# Patient Record
Sex: MALE | Race: WHITE | NOT HISPANIC OR LATINO | Employment: OTHER | URBAN - NONMETROPOLITAN AREA
[De-identification: names, ages, dates, MRNs, and addresses within clinical notes are randomized per-mention and may not be internally consistent; named-entity substitution may affect disease eponyms.]

---

## 2017-01-04 ENCOUNTER — HOSPITAL ENCOUNTER (EMERGENCY)
Facility: HOSPITAL | Age: 57
Discharge: HOME | End: 2017-01-04
Attending: PHYSICIAN ASSISTANT | Admitting: PHYSICIAN ASSISTANT
Payer: MEDICARE

## 2017-01-04 VITALS
SYSTOLIC BLOOD PRESSURE: 169 MMHG | HEART RATE: 117 BPM | TEMPERATURE: 98.4 F | RESPIRATION RATE: 14 BRPM | OXYGEN SATURATION: 94 % | DIASTOLIC BLOOD PRESSURE: 104 MMHG

## 2017-01-04 DIAGNOSIS — F11.93 OPIATE WITHDRAWAL (H): ICD-10-CM

## 2017-01-04 PROCEDURE — 96372 THER/PROPH/DIAG INJ SC/IM: CPT

## 2017-01-04 PROCEDURE — 99284 EMERGENCY DEPT VISIT MOD MDM: CPT | Mod: 25

## 2017-01-04 PROCEDURE — 99284 EMERGENCY DEPT VISIT MOD MDM: CPT | Performed by: PHYSICIAN ASSISTANT

## 2017-01-04 PROCEDURE — 25000125 ZZHC RX 250: Performed by: PHYSICIAN ASSISTANT

## 2017-01-04 RX ORDER — HYDROCODONE BITARTRATE AND ACETAMINOPHEN 5; 325 MG/1; MG/1
1-2 TABLET ORAL EVERY 4 HOURS PRN
Qty: 10 TABLET | Refills: 0 | Status: SHIPPED | OUTPATIENT
Start: 2017-01-04 | End: 2018-12-01

## 2017-01-04 RX ADMIN — MORPHINE SULFATE 10 MG: 10 INJECTION, SOLUTION INTRAMUSCULAR; INTRAVENOUS at 12:14

## 2017-01-04 ASSESSMENT — ENCOUNTER SYMPTOMS
CHILLS: 1
ABDOMINAL PAIN: 1
DIARRHEA: 1
CHEST TIGHTNESS: 0
NERVOUS/ANXIOUS: 1
ACTIVITY CHANGE: 1
APPETITE CHANGE: 1
FEVER: 0
VOMITING: 1
RHINORRHEA: 1
NAUSEA: 1
SHORTNESS OF BREATH: 0

## 2017-01-04 NOTE — ED AVS SNAPSHOT
[facility]    [address] [address]    Phone:  [phone]                                       Sidney Pereira   MRN: [id number]    Department:  [facility]   Date of Visit:  1/4/2017           After Visit Summary Signature Page     I have received my discharge instructions, and my questions have been answered. I have discussed any challenges I see with this plan with the nurse or doctor.    ..........................................................................................................................................  Patient/Patient Representative Signature      ..........................................................................................................................................  Patient Representative Print Name and Relationship to Patient    ..................................................               ................................................  Date                                            Time    ..........................................................................................................................................  Reviewed by Signature/Title    ...................................................              ..............................................  Date                                                            Time

## 2017-01-04 NOTE — ED PROVIDER NOTES
"  History     Chief Complaint   Patient presents with     Withdrawal     \"I've been on pain pills for 5-6 years. [facility] and now I'm going through withdrawals.\"     Nausea, Vomiting, & Diarrhea     since yesterday     The history is provided by the patient.     Sidney Preeira is a 56 year old male who presented to the ED along with his wife for withdrawal symptoms.  He tells me that he has been on chronic pain medications for at least 4 years.  [facility].  He has been out of his Morphine and hydrocodone since then.  He now has nausea, vomiting, diarrhea, abdominal cramping, anxiety, etc.  Tells me that he has gone through this type of withdrawal before and this is identical.  No fevers.  No melena.  No hematochezia.  Called his clinic and they of course advised him to come to the ED.     I have reviewed the Medications, Allergies, Past Medical and Surgical History, and Social History in the Epic system.    Review of Systems   Constitutional: Positive for chills, activity change and appetite change. Negative for fever.   HENT: Positive for congestion and rhinorrhea.    Respiratory: Negative for chest tightness and shortness of breath.    Cardiovascular: Negative for chest pain.   Gastrointestinal: Positive for nausea, vomiting, abdominal pain and diarrhea.   Genitourinary: Negative.    Musculoskeletal:        Chronic pain    Skin: Negative.    Psychiatric/Behavioral: The patient is nervous/anxious.        Physical Exam   BP: (!) 139/108 mmHg  Pulse: 117  Heart Rate: 107  Temp: 97.4  F (36.3  C)  Resp: 14  SpO2: 95 %  Physical Exam   Constitutional: He is oriented to person, place, and time. He appears well-developed and well-nourished. No distress.   Normal gait    Cardiovascular: Regular rhythm.    Tachycardia    Pulmonary/Chest: Effort normal.   Neurological: He is alert and oriented to person, place, and time.   Skin: Skin is warm and dry. "   Psychiatric: He has a normal mood and affect.   Nursing note and vitals reviewed.      ED Course   Procedures           Medications   morphine injection 10 mg (10 mg Intramuscular Given 1/4/17 1214)     Critical Care time:  none               Labs Ordered and Resulted from Time of ED Arrival Up to the Time of Departure from the ED - No data to display    Assessments & Plan (with Medical Decision Making)   [place]NPMP accessed.  He is truthful.  Consistent with opiate withdrawal. I do not believe that there is any reason for labs or imaging.  We will treat him with a small course of his usual medications and hope for arrival tomorrow. Needs to discuss this with his clinic.  Return here for ANY other concerns or questions. He can be safely discharged.      I have reviewed the nursing notes.    I have reviewed the findings, diagnosis, plan and need for follow up with the patient.    Discharge Medication List as of 1/4/2017 12:17 PM      START taking these medications    Details   HYDROcodone-acetaminophen (NORCO) 5-325 MG per tablet Take 1-2 tablets by mouth every 4 hours as needed for moderate to severe pain, Disp-10 tablet, R-0, Local Print             Final diagnoses:   Opiate withdrawal (H)       1/4/2017   [facility]      Patsy Shaw PA-C  01/04/17 0547

## 2017-01-04 NOTE — DISCHARGE INSTRUCTIONS
[facility]     You are welcome to return here for ANY other concerns or questions.

## 2017-01-04 NOTE — ED AVS SNAPSHOT
[facility]    [address] [address]    Phone:  [phone]                                       Sidney Pereira   MRN: [id number]    Department:  [facility]   Date of Visit:  1/4/2017           Patient Information     Date Of Birth          1960        Your diagnoses for this visit were:     Opiate withdrawal (H)        You were seen by Patsy Shaw PA-C.      Follow-up Information     Follow up with Olegario Santos MD.    Specialty:  Family Practice    Contact information:    [facility]  [address] [facility] [address] [phone]          Follow up with [facility].    Specialty:  EMERGENCY MEDICINE    Why:  If symptoms worsen    Contact information:    [address] [address] [phone]    Additional information:    From [facility]: Take [address]. Turn left at [address]. Turn left at the first stoplight on [address]. At the first stop sign, take a right onto [address]. Take a left into the parking lot and continue through until you reach the North enterance of the building.       From [place]: Take [address]. Take the [address] ramp towards [place]. Turn left onto [address]. Take a slight right onto [address]. Turn left at the first stoplight on [address]. At the first stop sign, take a right onto [address]. Take a left into the parking lot and continue through until you reach the North enterance of the building.       From [place]: Take [address]. Take a right at [address]. At the first stop sign, take a right onto [address]. Take a left into the parking lot and continue through until you reach the North enterance of the building.         Discharge Instructions       [facility]     You are welcome to return here for ANY other concerns or questions.         Review of your medicines       CONTINUE these medicines which may have CHANGED, or have new prescriptions. If we are uncertain of the size of tablets/capsules you have at home, strength may be listed as something that might have changed.        Dose / Directions Last dose taken    * HYDROcodone-acetaminophen 5-500 MG per tablet   Commonly known as:  VICODIN   Dose:  1 tablet   What changed:  Another medication with the same name was added. Make sure you understand how and when to take each.        Take 1 tablet by mouth 3 times daily   Refills:  0        * HYDROcodone-acetaminophen 5-325 MG per tablet   Commonly known as:  NORCO   Dose:  1-2 tablet   What changed:  You were already taking a medication with the same name, and this prescription was added. Make sure you understand how and when to take each.   Quantity:  10 tablet        Take 1-2 tablets by mouth every 4 hours as needed for moderate to severe pain   Refills:  0        * Notice:  This list has 2 medication(s) that are the same as other medications prescribed for you. Read the directions carefully, and ask your doctor or other care provider to review them with you.      Our records show that you are taking the medicines listed below. If these are incorrect, please call your family doctor or clinic.        Dose / Directions Last dose taken    * MORPHINE SULFATE PO   Dose:  15 mg        Take 15 mg by mouth 2 times daily   Refills:  0        * MORPHINE SULFATE PO   Dose:  30 mg        Take 30 mg by mouth At Bedtime   Refills:  0        * Notice:  This list has 2 medication(s) that are the same as other medications prescribed for you. Read the directions carefully, and ask your doctor or other care provider to review them with you.            Prescriptions were sent or printed at these locations (1 Prescription)                   Other Prescriptions                Printed at Department/Unit printer (1 of 1)         HYDROcodone-acetaminophen (NORCO) 5-325 MG per tablet               "  Orders Needing Specimen Collection     None      Pending Results     No orders found from 1/3/2017 to 2017.            Pending Culture Results     No orders found from 1/3/2017 to 2017.            Thank you for choosing [facility]       Thank you for choosing [facility] for your care. Our goal is always to provide you with excellent care. Hearing back from our patients is one way we can continue to improve our services. Please take a few minutes to complete the written survey that you may receive in the mail after you visit with us. Thank you!        LIFEmee Information     LIFE[employer]e lets you send messages to your doctor, view your test results, renew your prescriptions, schedule appointments and more. To sign up, go to [email or web address] . Click on \"Log in\" on the left side of the screen, which will take you to the Welcome page. Then click on \"Sign up Now\" on the right side of the page.     You will be asked to enter the access code listed below, as well as some personal information. Please follow the directions to create your username and password.     Your access code is: [email or web address]  Expires: 2017 12:17 PM     Your access code will  in 90 days. If you need help or a new code, please call your [facility] clinic or [phone].        Care EveryWhere ID     This is your Care EveryWhere ID. This could be used by other organizations to access your [facility] medical records  [id number]        After Visit Summary       This is your record. Keep this with you and show to your community pharmacist(s) and doctor(s) at your next visit.                  "

## 2017-01-04 NOTE — ED NOTES
Patient on Norco 5/500 and Morphine S.R. 15mg. His last dose of both medications was Monday. He is now experiencing nausea, vomiting, diarrhea, and SOB since yesterday. [facility], as his [facility] have not yet arrived via mail.

## 2017-02-09 ENCOUNTER — OFFICE VISIT (OUTPATIENT)
Dept: CHIROPRACTIC MEDICINE | Facility: OTHER | Age: 57
End: 2017-02-09
Attending: CHIROPRACTOR
Payer: MEDICARE

## 2017-02-09 DIAGNOSIS — M99.03 SEGMENTAL AND SOMATIC DYSFUNCTION OF LUMBAR REGION: Primary | ICD-10-CM

## 2017-02-09 DIAGNOSIS — M99.02 SEGMENTAL AND SOMATIC DYSFUNCTION OF THORACIC REGION: ICD-10-CM

## 2017-02-09 DIAGNOSIS — M54.50 ACUTE BILATERAL LOW BACK PAIN WITHOUT SCIATICA: ICD-10-CM

## 2017-02-09 PROCEDURE — 98940 CHIROPRACT MANJ 1-2 REGIONS: CPT | Mod: AT | Performed by: CHIROPRACTOR

## 2017-02-09 NOTE — PROGRESS NOTES
Subjective Finding:    Chief compalint: Patient presents with:  Back Pain  , Pain Scale: 5/10, Intensity: dull, ache and numbness, Duration: 1 days, Change since last visit: , Radiating: .    Date of injury:     Activities that the pain restricts:   Home/household activities: no.  Work duties: yes.  Hobbies/social: yes.  Sleep: yes.  Makes symptoms better: rest.  Makes symptoms worse: activity, lumbar extension and lumbar flexion.  Have you seen anyone else for the symptoms? No.  Work related: no.  Automobile related injury: no.    Objective and Assessment:    Posture Analysis:   High shoulder: right.  Head tilt: right.  High iliac crest: right.  Head carriage: forward.  Thoracic Kyphosis: neutral.  Lumbar Lordosis: neutral.    Lumbar Range of Motion: flexion decreased and extension decreased.  Cervical Range of Motion: .  Thoracic Range of Motion: .  Extremity Range of Motion: .    Palpation:   Quad lumb: right, referred pain: yes    Segmental dysfunction pre-treatment: T9, T10, L5 and PSIS Right.    Assessment post-treatment:  Cervical: .  Thoracic: ROM increased.  Lumbar: ROM increased and pain and tenderness decreased.    Comments: past neck surgery.      Complicating Factors: ddd of l spine.    Plan / Procedure:    Expected release date: .  Treatment plan: 1 times per month.  Instructed patient: rest.  Short term goals: reduce pain.  Long term goals: increase ADL and increase patient functional independence.  Prognosis: good.

## 2017-02-28 ENCOUNTER — OFFICE VISIT (OUTPATIENT)
Dept: CHIROPRACTIC MEDICINE | Facility: OTHER | Age: 57
End: 2017-02-28
Attending: CHIROPRACTOR
Payer: MEDICARE

## 2017-02-28 DIAGNOSIS — M99.03 SEGMENTAL AND SOMATIC DYSFUNCTION OF LUMBAR REGION: Primary | ICD-10-CM

## 2017-02-28 DIAGNOSIS — M54.50 ACUTE BILATERAL LOW BACK PAIN WITHOUT SCIATICA: ICD-10-CM

## 2017-02-28 DIAGNOSIS — M99.02 SEGMENTAL AND SOMATIC DYSFUNCTION OF THORACIC REGION: ICD-10-CM

## 2017-02-28 PROCEDURE — 98940 CHIROPRACT MANJ 1-2 REGIONS: CPT | Mod: AT | Performed by: CHIROPRACTOR

## 2017-02-28 NOTE — MR AVS SNAPSHOT
"              After Visit Summary   2017    Sidney Pereira    MRN: [id number]           Patient Information     Date Of Birth          1960        Visit Information        Provider Department      2017 9:10 AM Carlos Kaplan DC  [facility]        Today's Diagnoses     Segmental and somatic dysfunction of lumbar region    -  1    Acute bilateral low back pain without sciatica        Segmental and somatic dysfunction of thoracic region           Follow-ups after your visit        Who to contact     If you have questions or need follow up information about today's clinic visit or your schedule please contact  [facility] directly at [phone].  Normal or non-critical lab and imaging results will be communicated to you by [employer], letter or phone within 4 business days after the clinic has received the results. If you do not hear from us within 7 days, please contact the clinic through [employer] or phone. If you have a critical or abnormal lab result, we will notify you by phone as soon as possible.  Submit refill requests through [employer] or call your pharmacy and they will forward the refill request to us. Please allow 3 business days for your refill to be completed.          Additional Information About Your Visit        MyChart Information     BuildOut lets you send messages to your doctor, view your test results, renew your prescriptions, schedule appointments and more. To sign up, go to [email or web address] . Click on \"Log in\" on the left side of the screen, which will take you to the Welcome page. Then click on \"Sign up Now\" on the right side of the page.     You will be asked to enter the access code listed below, as well as some personal information. Please follow the directions to create your username and password.     Your access code is: [email or web address]  Expires: 2017 12:17 PM     Your access code will  in 90 days. If you need help or a new code, please call " your [facility] clinic or [phone].        Care EveryWhere ID     This is your Care EveryWhere ID. This could be used by other organizations to access your [facility] medical records  [id number]         Blood Pressure from Last 3 Encounters:   01/04/17 (!) 169/104   07/30/14 144/82    Weight from Last 3 Encounters:   07/29/14 205 lb (93 kg)              We Performed the Following     CHIROPRAC MANIP,SPINAL,1-2 REGIONS        Primary Care Provider Office Phone # Fax #    Olegario Santos [phone]       [facility] [address] [facility] [address]        Thank you!     Thank you for choosing  [facility]  for your care. Our goal is always to provide you with excellent care. Hearing back from our patients is one way we can continue to improve our services. Please take a few minutes to complete the written survey that you may receive in the mail after your visit with us. Thank you!             Your Updated Medication List - Protect others around you: Learn how to safely use, store and throw away your medicines at www.disposemymeds.org.          This list is accurate as of: 2/28/17 11:59 PM.  Always use your most recent med list.                   Brand Name Dispense Instructions for use    * HYDROcodone-acetaminophen 5-500 MG per tablet    VICODIN     Take 1 tablet by mouth 3 times daily       * HYDROcodone-acetaminophen 5-325 MG per tablet    NORCO    10 tablet    Take 1-2 tablets by mouth every 4 hours as needed for moderate to severe pain       * MORPHINE SULFATE PO      Take 15 mg by mouth 2 times daily       * MORPHINE SULFATE PO      Take 30 mg by mouth At Bedtime       * Notice:  This list has 4 medication(s) that are the same as other medications prescribed for you. Read the directions carefully, and ask your doctor or other care provider to review them with you.

## 2017-04-19 ENCOUNTER — OFFICE VISIT (OUTPATIENT)
Dept: CHIROPRACTIC MEDICINE | Facility: OTHER | Age: 57
End: 2017-04-19
Attending: CHIROPRACTOR
Payer: MEDICARE

## 2017-04-19 DIAGNOSIS — M54.50 ACUTE BILATERAL LOW BACK PAIN WITHOUT SCIATICA: ICD-10-CM

## 2017-04-19 DIAGNOSIS — M99.02 SEGMENTAL AND SOMATIC DYSFUNCTION OF THORACIC REGION: ICD-10-CM

## 2017-04-19 DIAGNOSIS — M99.03 SEGMENTAL AND SOMATIC DYSFUNCTION OF LUMBAR REGION: Primary | ICD-10-CM

## 2017-04-19 PROCEDURE — 98940 CHIROPRACT MANJ 1-2 REGIONS: CPT | Mod: AT | Performed by: CHIROPRACTOR

## 2017-04-19 NOTE — MR AVS SNAPSHOT
"              After Visit Summary   2017    Sidney Pereira    MRN: [id number]           Patient Information     Date Of Birth          1960        Visit Information        Provider Department      2017 9:40 AM Carlos Kaplan DC  [facility]        Today's Diagnoses     Segmental and somatic dysfunction of lumbar region    -  1    Acute bilateral low back pain without sciatica        Segmental and somatic dysfunction of thoracic region           Follow-ups after your visit        Who to contact     If you have questions or need follow up information about today's clinic visit or your schedule please contact  [facility] directly at [phone].  Normal or non-critical lab and imaging results will be communicated to you by [employer], letter or phone within 4 business days after the clinic has received the results. If you do not hear from us within 7 days, please contact the clinic through [employer] or phone. If you have a critical or abnormal lab result, we will notify you by phone as soon as possible.  Submit refill requests through [employer] or call your pharmacy and they will forward the refill request to us. Please allow 3 business days for your refill to be completed.          Additional Information About Your Visit        MyChart Information     [employer] lets you send messages to your doctor, view your test results, renew your prescriptions, schedule appointments and more. To sign up, go to [email or web address] . Click on \"Log in\" on the left side of the screen, which will take you to the Welcome page. Then click on \"Sign up Now\" on the right side of the page.     You will be asked to enter the access code listed below, as well as some personal information. Please follow the directions to create your username and password.     Your access code is: [email or web address]  Expires: 2017 10:18 AM     Your access code will  in 90 days. If you need help or a new code, please call " your [facility] clinic or [phone].        Care EveryWhere ID     This is your Care EveryWhere ID. This could be used by other organizations to access your [facility] medical records  [id number]         Blood Pressure from Last 3 Encounters:   01/04/17 (!) 169/104   07/30/14 144/82    Weight from Last 3 Encounters:   07/29/14 205 lb (93 kg)              We Performed the Following     CHIROPRAC MANIP,SPINAL,1-2 REGIONS        Primary Care Provider Office Phone # Fax #    Olegario Santos [phone]       [facility] [address] [facility] [address]        Thank you!     Thank you for choosing  [facility]  for your care. Our goal is always to provide you with excellent care. Hearing back from our patients is one way we can continue to improve our services. Please take a few minutes to complete the written survey that you may receive in the mail after your visit with us. Thank you!             Your Updated Medication List - Protect others around you: Learn how to safely use, store and throw away your medicines at www.disposemymeds.org.          This list is accurate as of: 4/19/17 10:18 AM.  Always use your most recent med list.                   Brand Name Dispense Instructions for use    * HYDROcodone-acetaminophen 5-500 MG per tablet    VICODIN     Take 1 tablet by mouth 3 times daily       * HYDROcodone-acetaminophen 5-325 MG per tablet    NORCO    10 tablet    Take 1-2 tablets by mouth every 4 hours as needed for moderate to severe pain       * MORPHINE SULFATE PO      Take 15 mg by mouth 2 times daily       * MORPHINE SULFATE PO      Take 30 mg by mouth At Bedtime       * Notice:  This list has 4 medication(s) that are the same as other medications prescribed for you. Read the directions carefully, and ask your doctor or other care provider to review them with you.

## 2017-07-24 ENCOUNTER — OFFICE VISIT (OUTPATIENT)
Dept: CHIROPRACTIC MEDICINE | Facility: OTHER | Age: 57
End: 2017-07-24
Attending: CHIROPRACTOR
Payer: MEDICARE

## 2017-07-24 DIAGNOSIS — M54.50 ACUTE BILATERAL LOW BACK PAIN WITHOUT SCIATICA: ICD-10-CM

## 2017-07-24 DIAGNOSIS — M99.03 SEGMENTAL AND SOMATIC DYSFUNCTION OF LUMBAR REGION: Primary | ICD-10-CM

## 2017-07-24 DIAGNOSIS — M99.02 SEGMENTAL AND SOMATIC DYSFUNCTION OF THORACIC REGION: ICD-10-CM

## 2017-07-24 PROCEDURE — 98940 CHIROPRACT MANJ 1-2 REGIONS: CPT | Mod: AT | Performed by: CHIROPRACTOR

## 2017-07-24 NOTE — MR AVS SNAPSHOT
"              After Visit Summary   2017    Sidney Pereira    MRN: [id number]           Patient Information     Date Of Birth          1960        Visit Information        Provider Department      2017 9:40 AM Carlos Kaplan DC  [facility]        Today's Diagnoses     Segmental and somatic dysfunction of lumbar region    -  1    Acute bilateral low back pain without sciatica        Segmental and somatic dysfunction of thoracic region           Follow-ups after your visit        Who to contact     If you have questions or need follow up information about today's clinic visit or your schedule please contact  [facility] directly at [phone].  Normal or non-critical lab and imaging results will be communicated to you by [employer], letter or phone within 4 business days after the clinic has received the results. If you do not hear from us within 7 days, please contact the clinic through [employer] or phone. If you have a critical or abnormal lab result, we will notify you by phone as soon as possible.  Submit refill requests through [employer] or call your pharmacy and they will forward the refill request to us. Please allow 3 business days for your refill to be completed.          Additional Information About Your Visit        MyChart Information     [employer] lets you send messages to your doctor, view your test results, renew your prescriptions, schedule appointments and more. To sign up, go to [email or web address] . Click on \"Log in\" on the left side of the screen, which will take you to the Welcome page. Then click on \"Sign up Now\" on the right side of the page.     You will be asked to enter the access code listed below, as well as some personal information. Please follow the directions to create your username and password.     Your access code is: [email or web address]  Expires: 10/29/2017  7:50 AM     Your access code will  in 90 days. If you need help or a new code, please call " your [place] clinic or [phone].        Care EveryWhere ID     This is your Care EveryWhere ID. This could be used by other organizations to access your [place] medical records  [id number]         Blood Pressure from Last 3 Encounters:   01/04/17 (!) 169/104   07/30/14 144/82    Weight from Last 3 Encounters:   07/29/14 205 lb (93 kg)              We Performed the Following     CHIROPRAC MANIP,SPINAL,1-2 REGIONS        Primary Care Provider Office Phone # Fax #    Olegraio Santos [phone]       [facility] [address] [facility] [address]        Equal Access to Services     [facility]: Hadii aad ku hadasho Soomaali, waaxda luqadaha, qaybta kaalmada adeegyada, waxtrinidad kim . So [facility] [phone].    ATENCIÓN: Si habla español, tiene a helms disposición servicios gratuitos de asistencia lingüística. [facility] [phone].    We comply with applicable federal civil rights laws and [place] laws. We do not discriminate on the basis of race, color, national origin, age, disability sex, sexual orientation or gender identity.            Thank you!     Thank you for choosing  [facility]  for your care. Our goal is always to provide you with excellent care. Hearing back from our patients is one way we can continue to improve our services. Please take a few minutes to complete the written survey that you may receive in the mail after your visit with us. Thank you!             Your Updated Medication List - Protect others around you: Learn how to safely use, store and throw away your medicines at www.disposemymeds.org.          This list is accurate as of: 7/24/17 11:59 PM.  Always use your most recent med list.                   Brand Name Dispense Instructions for use Diagnosis    * HYDROcodone-acetaminophen 5-500 MG per tablet    VICODIN     Take 1 tablet by mouth 3 times daily        * HYDROcodone-acetaminophen 5-325 MG per tablet    NORCO    10 tablet     Take 1-2 tablets by mouth every 4 hours as needed for moderate to severe pain        * MORPHINE SULFATE PO      Take 15 mg by mouth 2 times daily        * MORPHINE SULFATE PO      Take 30 mg by mouth At Bedtime        * Notice:  This list has 4 medication(s) that are the same as other medications prescribed for you. Read the directions carefully, and ask your doctor or other care provider to review them with you.

## 2017-08-06 ENCOUNTER — HOSPITAL ENCOUNTER (EMERGENCY)
Facility: HOSPITAL | Age: 57
Discharge: HOME | End: 2017-08-06
Attending: NURSE PRACTITIONER | Admitting: NURSE PRACTITIONER
Payer: MEDICARE

## 2017-08-06 VITALS
DIASTOLIC BLOOD PRESSURE: 88 MMHG | WEIGHT: 220 LBS | TEMPERATURE: 97 F | HEART RATE: 95 BPM | OXYGEN SATURATION: 99 % | RESPIRATION RATE: 18 BRPM | SYSTOLIC BLOOD PRESSURE: 149 MMHG

## 2017-08-06 PROCEDURE — 40000268 ZZH STATISTIC NO CHARGES

## 2018-07-13 ENCOUNTER — HOSPITAL ENCOUNTER (EMERGENCY)
Facility: HOSPITAL | Age: 58
Discharge: HOME | End: 2018-07-13
Admitting: INTERNAL MEDICINE
Payer: MEDICARE

## 2018-07-13 VITALS
DIASTOLIC BLOOD PRESSURE: 97 MMHG | OXYGEN SATURATION: 97 % | SYSTOLIC BLOOD PRESSURE: 154 MMHG | TEMPERATURE: 97.6 F | RESPIRATION RATE: 19 BRPM | HEART RATE: 94 BPM
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
## 2018-07-13 DIAGNOSIS — G89.29 OTHER CHRONIC PAIN: ICD-10-CM

## 2018-07-13 PROCEDURE — 99283 EMERGENCY DEPT VISIT LOW MDM: CPT | Performed by: INTERNAL MEDICINE

## 2018-07-13 PROCEDURE — 99283 EMERGENCY DEPT VISIT LOW MDM: CPT

## 2018-07-13 PROCEDURE — 25000132 ZZH RX MED GY IP 250 OP 250 PS 637: Performed by: INTERNAL MEDICINE

## 2018-07-13 PROCEDURE — A9270 NON-COVERED ITEM OR SERVICE: HCPCS | Mod: GY | Performed by: INTERNAL MEDICINE

## 2018-07-13 RX ORDER — OXYCODONE HYDROCHLORIDE 5 MG/1
5 TABLET ORAL ONCE
Status: COMPLETED | OUTPATIENT
Start: 2018-07-13 | End: 2018-07-13

## 2018-07-13 RX ADMIN — OXYCODONE HYDROCHLORIDE 5 MG: 5 TABLET ORAL at 03:53

## 2018-07-13 NOTE — DISCHARGE INSTRUCTIONS
Mosquito Bite    There are many different kinds of mosquitoes. It is only the female mosquito that bites people. They need blood in order to lay their eggs. When a mosquito bites you, it pushes a needle-like mouthpart into your skin. Then it injects some saliva before sucking your blood. It is the mosquito saliva that causes the local reaction you are having.  There may be redness, swelling and itching. Some people are more sensitive to mosquito bites than others and may feel dizzy and weak.  Home care    Wash the area with soap and water once a day. Watch for any signs of infection.    If itching is a problem, you may use an over-the-counter anti-itch spray or cream, such as any medicine with benzocaine in it. You may also put an ice pack on the bite area as needed to relieve pain, itching, or swelling. Use it for 20 minutes every few hours. You can make your own ice pack by putting ice cubes in a plastic bag and wrapping it in a thin towel. If the itching is severe, you may put topical 1% hydrocortisone on the bites twice a day. Don't use this for more than 3 to 5 days. Don't put it on your face or genital area.    Diphenhydramine is an antihistamine available at drug and grocery stores. It may be used to reduce itching if there are multiple bites, unless your doctor gave you prescription antihistamine. Use lower doses during the daytime and higher doses at bedtime since the drug may make you sleepy. Do not use diphenhydramine if you have glaucoma or if you are a man with trouble urinating due to an enlarged prostate. Loratidine is an antihistamine that makes you less sleepy and is a good alternative for daytime use.    You may use acetaminophen or ibuprofen to control pain, unless your doctor prescribed another pain medicine. Talk with your doctor before using these medicines if you have chronic liver or kidney disease. Also talk with your doctor if you've ever had a stomach ulcer or GI bleeding.  Avoiding  mosquito bites  These are things you can do to prevent mosquito bites:    Avoid being outside when mosquitoes are most active in the early morning, late afternoon and early evening.    If you are outdoors when mosquitoes are active, wear socks, long sleeves, and long pants, and use insect repellent. The most effective insect repellent is DEET (10% to 30%). Children should not use more than 10% strength. Don't put DEET on children's hands because it is toxic. Young children tent to put their hands in their mouth. Don't use DEET on infants. Don't use DEET if you are pregnant.    You can spray your clothing with a repellent containing DEET or permethrin. If you do this, you do not need to put repellent on the skin under clothing that has been sprayed.    The CDC also recommends the following as alternate mosquito repellents: picaridin, SU9445, and the plant-based oil of lemon eucalyptus.    Mosquitoes lay their eggs in standing water. Remove breeding areas around your home. Dispose of cans, containers and tires that may collect water. Clear your roof gutters and be sure they drain properly. Keep window and door screens in good repair.  Follow-up care  Follow up with your healthcare provider, or as advised.  When to seek medical advice  Call your healthcare provider if any of these occur:    Shortness of breath or difficulty breathing    Dizziness, weakness or fainting    Headache, fever, chills, muscle or joint aches, or vomiting    New rash    Signs of infection:  ? Spreading redness  ? Increased pain or swelling  ? Fever of 100.4 F (38 C) or higher, or as directed by your healthcare provider  ? Colored fluid draining from the wound  Date Last Reviewed: 10/1/2016    4523-5508 [employer]. [address] [place] [address], [place] [address]. All rights reserved. This information is not intended as a substitute for professional medical care. Always follow your healthcare professional's instructions.

## 2018-07-13 NOTE — ED NOTES
Discharge instructions given. Verbalized understanding. Rates pain 9/10 but states it will get better once the pain med kicks in. Ambulated out of ED independently to awaiting private vehicle driven by brother.

## 2018-07-13 NOTE — ED AVS SNAPSHOT
[facility]    [address] [address]    Phone:  [phone]                                       Sidney Pereira   MRN: [id number]    Department:  [facility]   Date of Visit:  7/13/2018           After Visit Summary Signature Page     I have received my discharge instructions, and my questions have been answered. I have discussed any challenges I see with this plan with the nurse or doctor.    ..........................................................................................................................................  Patient/Patient Representative Signature      ..........................................................................................................................................  Patient Representative Print Name and Relationship to Patient    ..................................................               ................................................  Date                                            Time    ..........................................................................................................................................  Reviewed by Signature/Title    ...................................................              ..............................................  Date                                                            Time

## 2018-07-13 NOTE — ED AVS SNAPSHOT
[facility]    [address] [place] [address]    Phone:  [phone]                                       Sidney Pereira   MRN: [id number]    Department:  [facility]   Date of Visit:  7/13/2018           Patient Information     Date Of Birth          1960        Your diagnoses for this visit were:     Mosquito bite, initial encounter     Other chronic pain       Follow-up Information     Schedule an appointment as soon as possible for a visit with Olegario Santos MD.    Specialty:  Family Practice    Contact information:    [facility]  [address] [place] [id number]  [phone]          Discharge Instructions         Mosquito Bite    There are many different kinds of mosquitoes. It is only the female mosquito that bites people. They need blood in order to lay their eggs. When a mosquito bites you, it pushes a needle-like mouthpart into your skin. Then it injects some saliva before sucking your blood. It is the mosquito saliva that causes the local reaction you are having.  There may be redness, swelling and itching. Some people are more sensitive to mosquito bites than others and may feel dizzy and weak.  Home care    Wash the area with soap and water once a day. Watch for any signs of infection.    If itching is a problem, you may use an over-the-counter anti-itch spray or cream, such as any medicine with benzocaine in it. You may also put an ice pack on the bite area as needed to relieve pain, itching, or swelling. Use it for 20 minutes every few hours. You can make your own ice pack by putting ice cubes in a plastic bag and wrapping it in a thin towel. If the itching is severe, you may put topical 1% hydrocortisone on the bites twice a day. Don't use this for more than 3 to 5 days. Don't put it on your face or genital area.    Diphenhydramine is an antihistamine available at drug and grocery stores. It may be used to reduce itching if there are multiple  bites, unless your doctor gave you prescription antihistamine. Use lower doses during the daytime and higher doses at bedtime since the drug may make you sleepy. Do not use diphenhydramine if you have glaucoma or if you are a man with trouble urinating due to an enlarged prostate. Loratidine is an antihistamine that makes you less sleepy and is a good alternative for daytime use.    You may use acetaminophen or ibuprofen to control pain, unless your doctor prescribed another pain medicine. Talk with your doctor before using these medicines if you have chronic liver or kidney disease. Also talk with your doctor if you've ever had a stomach ulcer or GI bleeding.  Avoiding mosquito bites  These are things you can do to prevent mosquito bites:    Avoid being outside when mosquitoes are most active in the early morning, late afternoon and early evening.    If you are outdoors when mosquitoes are active, wear socks, long sleeves, and long pants, and use insect repellent. The most effective insect repellent is DEET (10% to 30%). Children should not use more than 10% strength. Don't put DEET on children's hands because it is toxic. Young children tent to put their hands in their mouth. Don't use DEET on infants. Don't use DEET if you are pregnant.    You can spray your clothing with a repellent containing DEET or permethrin. If you do this, you do not need to put repellent on the skin under clothing that has been sprayed.    The CDC also recommends the following as alternate mosquito repellents: picaridin, QT4597, and the plant-based oil of lemon eucalyptus.    Mosquitoes lay their eggs in standing water. Remove breeding areas around your home. Dispose of cans, containers and tires that may collect water. Clear your roof gutters and be sure they drain properly. Keep window and door screens in good repair.  Follow-up care  Follow up with your healthcare provider, or as advised.  When to seek medical advice  Call your  healthcare provider if any of these occur:    Shortness of breath or difficulty breathing    Dizziness, weakness or fainting    Headache, fever, chills, muscle or joint aches, or vomiting    New rash    Signs of infection:  ? Spreading redness  ? Increased pain or swelling  ? Fever of 100.4 F (38 C) or higher, or as directed by your healthcare provider  ? Colored fluid draining from the wound  Date Last Reviewed: 10/1/2016    5878-3601 [employer]. [address] [place] [address]. All rights reserved. This information is not intended as a substitute for professional medical care. Always follow your healthcare professional's instructions.             Review of your medicines      Our records show that you are taking the medicines listed below. If these are incorrect, please call your family doctor or clinic.        Dose / Directions Last dose taken    * HYDROcodone-acetaminophen 5-500 MG per tablet   Commonly known as:  VICODIN   Dose:  1 tablet        Take 1 tablet by mouth 3 times daily   Refills:  0        * HYDROcodone-acetaminophen 5-325 MG per tablet   Commonly known as:  NORCO   Dose:  1-2 tablet   Quantity:  10 tablet        Take 1-2 tablets by mouth every 4 hours as needed for moderate to severe pain   Refills:  0        * MORPHINE SULFATE PO   Dose:  15 mg        Take 15 mg by mouth 2 times daily   Refills:  0        * MORPHINE SULFATE PO   Dose:  30 mg        Take 30 mg by mouth At Bedtime   Refills:  0        * Notice:  This list has 4 medication(s) that are the same as other medications prescribed for you. Read the directions carefully, and ask your doctor or other care provider to review them with you.            Orders Needing Specimen Collection     None      Pending Results     No orders found from 7/11/2018 to 7/14/2018.            Pending Culture Results     No orders found from 7/11/2018 to 7/14/2018.            Thank you for choosing [facility]       Thank you for choosing  "[place] for your care. Our goal is always to provide you with excellent care. Hearing back from our patients is one way we can continue to improve our services. Please take a few minutes to complete the written survey that you may receive in the mail after you visit with us. Thank you!        [employer] Information     [employer] lets you send messages to your doctor, view your test results, renew your prescriptions, schedule appointments and more. To sign up, go to [email or web address] . Click on \"Log in\" on the left side of the screen, which will take you to the Welcome page. Then click on \"Sign up Now\" on the right side of the page.     You will be asked to enter the access code listed below, as well as some personal information. Please follow the directions to create your username and password.     Your access code is: [email or web address]  Expires: 10/11/2018  3:55 AM     Your access code will  in 90 days. If you need help or a new code, please call your [place] clinic or [phone].        Care EveryWhere ID     This is your Care EveryWhere ID. This could be used by other organizations to access your [place] medical records  [id number]        Equal Access to Services     JUSTIN GUZMAN : Cristian Cheatham, coni rosas, alton boss, martha ross. So [facility] [phone].    ATENCIÓN: Si habla español, tiene a helms disposición servicios gratuitos de asistencia lingüística. Llame al [phone].    We comply with applicable federal civil rights laws and [place] laws. We do not discriminate on the basis of race, color, national origin, age, disability, sex, sexual orientation, or gender identity.            After Visit Summary       This is your record. Keep this with you and show to your community pharmacist(s) and doctor(s) at your next visit.                  "

## 2018-07-29 ASSESSMENT — ENCOUNTER SYMPTOMS
COLOR CHANGE: 0
ABDOMINAL PAIN: 0
ARTHRALGIAS: 0
HEADACHES: 0
CONFUSION: 0
NECK STIFFNESS: 0
SHORTNESS OF BREATH: 0
DIFFICULTY URINATING: 0
FEVER: 0
EYE REDNESS: 0

## 2018-07-29 NOTE — ED PROVIDER NOTES
"  History     Chief Complaint   Patient presents with     Insect Bite     \"I was bit by a weird looking mosquito\"     Medication Refill     requesting refill of morphine 15 mg [facility] yet     Patient is a 57 year old male presenting with rash.   Rash   Location:  Shoulder/arm  Quality: redness    Severity:  Mild  Onset quality:  Sudden  Chronicity:  New  Associated symptoms: no abdominal pain, no fever, no headaches, no joint pain and no shortness of breath        Problem List:    There are no active problems to display for this patient.       Past Medical History:    No past medical history on file.    Past Surgical History:    No past surgical history on file.    Family History:    No family history on file.    Social History:  Marital Status:   [2]  Social History   Substance Use Topics     Smoking status: Not on file     Smokeless tobacco: Not on file     Alcohol use Not on file        Medications:      HYDROcodone-acetaminophen (NORCO) 5-325 MG per tablet   HYDROcodone-acetaminophen (VICODIN) 5-500 MG per tablet   MORPHINE SULFATE PO   MORPHINE SULFATE PO         Review of Systems   Constitutional: Negative for fever.   HENT: Negative for congestion.    Eyes: Negative for redness.   Respiratory: Negative for shortness of breath.    Cardiovascular: Negative for chest pain.   Gastrointestinal: Negative for abdominal pain.   Genitourinary: Negative for difficulty urinating.   Musculoskeletal: Negative for arthralgias and neck stiffness.   Skin: Positive for rash. Negative for color change.   Neurological: Negative for headaches.   Psychiatric/Behavioral: Negative for confusion.       Physical Exam   BP: 154/97  Pulse: 94  Temp: 97.6  F (36.4  C)  Resp: 19  SpO2: 97 %      Physical Exam   Constitutional: No distress.   HENT:   Head: Atraumatic.   Mouth/Throat: Oropharynx is clear and moist. No oropharyngeal exudate.   Eyes: Pupils are equal, round, and reactive to light. No scleral icterus. "   Cardiovascular: Normal heart sounds and intact distal pulses.    Pulmonary/Chest: Breath sounds normal. No respiratory distress.   Abdominal: Soft. Bowel sounds are normal. There is no tenderness.   Musculoskeletal: He exhibits no edema or tenderness.   Skin: Skin is warm. No rash noted. He is not diaphoretic.       ED Course     ED Course     Procedures             No results found for this or any previous visit (from the past 24 hour(s)).    Medications   oxyCODONE IR (ROXICODONE) tablet 5 mg (5 mg Oral Given 7/13/18 0353)       Assessments & Plan (with Medical Decision Making)   Insect bit , no complication  Dc home, fu with PCP  I have reviewed the nursing notes.    I have reviewed the findings, diagnosis, plan and need for follow up with the patient.      Discharge Medication List as of 7/13/2018  3:55 AM          Final diagnoses:   Mosquito bite, initial encounter   Other chronic pain       7/13/2018   [facility]     Eugene Graham MD  07/29/18 [id number]

## 2018-12-01 ENCOUNTER — HOSPITAL ENCOUNTER (EMERGENCY)
Facility: HOSPITAL | Age: 58
Discharge: HOME | End: 2018-12-01
Attending: PHYSICIAN ASSISTANT | Admitting: PHYSICIAN ASSISTANT
Payer: MEDICARE

## 2018-12-01 VITALS
OXYGEN SATURATION: 98 % | HEART RATE: 97 BPM | TEMPERATURE: 96.8 F | SYSTOLIC BLOOD PRESSURE: 146 MMHG | DIASTOLIC BLOOD PRESSURE: 89 MMHG | RESPIRATION RATE: 16 BRPM

## 2018-12-01 DIAGNOSIS — F11.93 OPIATE WITHDRAWAL (H): ICD-10-CM

## 2018-12-01 PROCEDURE — G0463 HOSPITAL OUTPT CLINIC VISIT: HCPCS

## 2018-12-01 PROCEDURE — 99203 OFFICE O/P NEW LOW 30 MIN: CPT | Performed by: PHYSICIAN ASSISTANT

## 2018-12-01 ASSESSMENT — ENCOUNTER SYMPTOMS
RESPIRATORY NEGATIVE: 1
ABDOMINAL PAIN: 1
CHILLS: 0
HEADACHES: 1
DIAPHORESIS: 1
FEVER: 1

## 2018-12-01 NOTE — ED AVS SNAPSHOT
[facility]    [address] [address]    Phone:  [phone]                                       Sidney Pereira   MRN: [id number]    Department:  [facility]   Date of Visit:  12/1/2018           Patient Information     Date Of Birth          1960        Your diagnoses for this visit were:     Opiate withdrawal (H)        You were seen by Kameron Ivan PA-C.      Follow-up Information     Follow up with [facility].    Specialty:  EMERGENCY MEDICINE    Why:  If symptoms worsen    Contact information:    [address] [address] [phone]    Additional information:    From [place]: Take [address]. Turn left at [address]. Turn left at the first stoplight on [address]. At the first stop sign, take a right onto [address]. Take a left into the parking lot and continue through until you reach the North enterance of the building.       From [place]: Take [address]. Take the [address] ramp towards [place]. Turn left onto [address]. Take a slight right onto [address]. Turn left at the first stoplight on [address]. At the first stop sign, take a right onto [address]. Take a left into the parking lot and continue through until you reach the North enterance of the building.       From [place]: Take [address]. Take a right at [address]. At the first stop sign, take a right onto [address]. Take a left into the parking lot and continue through until you reach the North enterance of the building.         Follow up with Olegario Santos MD In 5 days.    Specialty:  Family Practice    Contact information:    [facility]  [address] [facility] [id number] [phone]          Discharge Instructions       Opioid Pain Medicines (Narcotics)  What You Need to Know  What are opioids?   Opioids are pain medicines that must be prescribed  by a doctor. They are also known as narcotics. Examples are:     morphine (MS Contin, Malinda)    oxycodone (Oxycontin)    oxycodone and acetaminophen (Percocet)    hydrocodone and acetaminophen (Vicodin, Norco)    fentanyl patch (Duragesic)    hydromorphone (Dilaudid)    methadone  What do opioids do well?   Opioids are best for short-term pain after a surgery or injury. They also work well for cancer pain. Unlike other pain medicines, they do not harm the liver or kidney (though people with liver or kidney problems should use caution while taking opioids). They may help some people with long-lasting (chronic) pain.   What do opioids NOT do well?   Opioids never get rid of pain entirely, and they do not work well for most patients with chronic pain. Opioids do not reduce swelling, one of the causes of pain. They also don't work well for nerve pain.   Prescribed opioids aren't the best way to manage chronic pain.   Other ways to manage pain include:     ibuprofen or acetaminophen. You should always try this first.     acupuncture or massage, deep breathing, meditation, visual imagery, aromatherapy       use heat or ice at the pain site     physical therapy and exercise     stop smoking     see a counselor or therapist   Risks and side effects  Talk to your doctor before you start or decide to keep taking one of these medicines. Risks and side effects include:    Lowering your breathing rate enough to cause death    Overdose, including death, especially if taking higher than prescribed doses    Long-term opioid use    Worse depression symptoms; less pleasure in things you usually enjoy    Feeling tired or sluggish    Slower thoughts or cloudy thinking    Being more sensitive to pain over time; pain is harder to control    Trouble sleeping or restless sleep    Changes in hormone levels (for example, less testosterone)    Changes in sex drive or ability to have sex    Constipation    Unsafe driving    Itching and  sweating    Feeling dizzy    Nausea, vomiting and dry mouth  What else should I know about opioids?    When someone takes opioids for too long or too often, they become dependent. This means that if you stop or reduce the medicine too quickly, you will have withdrawal symptoms.    Dependence is not the same as addiction. Addiction is when people keep using a substance despite harm. This includes harm to the body, their mind or their relations with others. If you have a history of drug or alcohol abuse, taking opioids can cause a relapse.    Over time, opioids don't work as well. Most people will need higher and higher doses. The higher the dose, the more serious the side effects. We don't know the long-term effects of opioids.    People who have used opioids for a long time may have a lower quality of life, worse depression, higher levels of pain and more visits to doctors.    Never share your opioids with others; it is against the law. Be sure to store opioids in a secure place, locked if possible.Young children can easily swallow them and overdose.    You can overdose on opioids. Signs of overdose include decrease or loss of consciousnes, slowed breathing, trouble waking and blue lips. If someone is worried about overdose, they should call 911.     If you are at risk for overdose, you may get naloxone (Narcan), a medicine that reverses the effects of opioids. If you overdose, a friend or family member can give you Narcan while waiting for the ambulance. They need to know the signs of overdose and how to give Narcan.  While you're taking opioids:  Don't use alcohol or street drugs. Taking them together can cause death.  Don't take any of these medicines unless your doctor says its okay. Taking these with opioids can cause death.  Benzodiazepines (such as lorazepam or diazepam).  Muscle relaxers (such as cyclobenzaprine)  sleeping pills  other opioids   Safe disposal of opioids  Find your area drug take-back  program, your pharmacy mail-back program, buy a special disposal bag (such as Deterra) from your pharmacy or flush them down the toilet. Use the guidelines at www.fda.gov/drugs/resourcesforyou.  For informational purposes only. Not to replace the advice of your health care provider.   Copyright   2016 [facility]. All rights reserved. [employer] 715167 - Rev 02/18.         Review of your medicines      CONTINUE these medicines which may have CHANGED, or have new prescriptions. If we are uncertain of the size of tablets/capsules you have at home, strength may be listed as something that might have changed.        Dose / Directions Last dose taken    * MORPHINE SULFATE PO   Dose:  15 mg   What changed:  Another medication with the same name was added. Make sure you understand how and when to take each.        Take 15 mg by mouth 2 times daily   Refills:  0        * MORPHINE SULFATE PO   Dose:  30 mg   What changed:  Another medication with the same name was added. Make sure you understand how and when to take each.        Take 30 mg by mouth At Bedtime   Refills:  0        * Morphine Sulfate ER 15 MG T12a   Dose:  15 mg   What changed:  You were already taking a medication with the same name, and this prescription was added. Make sure you understand how and when to take each.   Quantity:  10 each        Take 15 mg by mouth 3 times daily Take 15 mg am, 15 mg afternoon, and 30 mg before bed, as previously prescribed   Refills:  0        * Notice:  This list has 3 medication(s) that are the same as other medications prescribed for you. Read the directions carefully, and ask your doctor or other care provider to review them with you.      Our records show that you are taking the medicines listed below. If these are incorrect, please call your family doctor or clinic.        Dose / Directions Last dose taken    HYDROcodone-acetaminophen 5-500 MG per tablet   Commonly known as:  VICODIN   Dose:  1 tablet         Take 1 tablet by mouth 3 times daily   Refills:  0                Information about OPIOIDS     PRESCRIPTION OPIOIDS: WHAT YOU NEED TO KNOW   We gave you an opioid (narcotic) pain medicine. It is important to manage your pain, but opioids are not always the best choice. You should first try all the other options your care team gave you. Take this medicine for as short a time (and as few doses) as possible.    Some activities can increase your pain, such as bandage changes or therapy sessions. It may help to take your pain medicine 30 to 60 minutes before these activities. Reduce your stress by getting enough sleep, working on hobbies you enjoy and practicing relaxation or meditation. Talk to your care team about ways to manage your pain beyond prescription opioids.    These medicines have risks:    DO NOT drive when on new or higher doses of pain medicine. These medicines can affect your alertness and reaction times, and you could be arrested for driving under the influence (DUI). If you need to use opioids long-term, talk to your care team about driving.    DO NOT operate heavy machinery    DO NOT do any other dangerous activities while taking these medicines.    DO NOT drink any alcohol while taking these medicines.     If the opioid prescribed includes acetaminophen, DO NOT take with any other medicines that contain acetaminophen. Read all labels carefully. Look for the word  acetaminophen  or  Tylenol.  Ask your pharmacist if you have questions or are unsure.    You can get addicted to pain medicines, especially if you have a history of addiction (chemical, alcohol or substance dependence). Talk to your care team about ways to reduce this risk.    All opioids tend to cause constipation. Drink plenty of water and eat foods that have a lot of fiber, such as fruits, vegetables, prune juice, apple juice and high-fiber cereal. Take a laxative (Miralax, milk of magnesia, Colace, Senna) if you don t move your bowels at  "least every other day. Other side effects include upset stomach, sleepiness, dizziness, throwing up, tolerance (needing more of the medicine to have the same effect), physical dependence and slowed breathing.    Store your pills in a secure place, locked if possible. We will not replace any lost or stolen medicine. If you don t finish your medicine, please throw away (dispose) as directed by your pharmacist. The [place] Pollution Control Agency has more information about safe disposal: https://www.CADsurf.state.[place]/living-green/managing-unwanted-medications        Prescriptions were sent or printed at these locations (1 Prescription)                   [id number] - [place], [place] - [address] AT [facility] OF [facility] [address]   [address], [place] [address]    Telephone:  [phone]   Fax:  [phone]   Hours:                  Printed at Department/Unit printer (1 of 1)         Morphine Sulfate ER 15 MG T12A                Orders Needing Specimen Collection     None      Pending Results     No orders found from 11/29/2018 to 12/2/2018.            Pending Culture Results     No orders found from 11/29/2018 to 12/2/2018.            Thank you for choosing [facility]       Thank you for choosing [facility] for your care. Our goal is always to provide you with excellent care. Hearing back from our patients is one way we can continue to improve our services. Please take a few minutes to complete the written survey that you may receive in the mail after you visit with us. Thank you!        [employer] Information     [employer] lets you send messages to your doctor, view your test results, renew your prescriptions, schedule appointments and more. To sign up, go to [email or web address] . Click on \"Log in\" on the left side of the screen, which will take you to the Welcome page. Then click on \"Sign up Now\" on the right side of the page.     You will be asked to enter the access code listed below, as well as some " personal information. Please follow the directions to create your username and password.     Your access code is: [email or web address]  Expires: 3/1/2019  4:23 PM     Your access code will  in 90 days. If you need help or a new code, please call your [place] clinic or [phone].        Care EveryWhere ID     This is your Care EveryWhere ID. This could be used by other organizations to access your [facility] medical records  [id number]        Equal Access to Services     [facility] Hospital[facility] : Hadeliz oropezao Sobishnu, waaxda luqadaha, qaybta kaalmada adehelena, martha kim . So [facility] [phone].    ATENCIÓN: Si habla español, tiene a helms disposición servicios gratuitos de asistencia lingüística. Llame al [phone].    We comply with applicable federal civil rights laws and [place] laws. We do not discriminate on the basis of race, color, national origin, age, disability, sex, sexual orientation, or gender identity.            After Visit Summary       This is your record. Keep this with you and show to your community pharmacist(s) and doctor(s) at your next visit.

## 2018-12-01 NOTE — DISCHARGE INSTRUCTIONS
Opioid Pain Medicines (Narcotics)  What You Need to Know  What are opioids?   Opioids are pain medicines that must be prescribed by a doctor. They are also known as narcotics. Examples are:     morphine (MS Contin, Malinda)    oxycodone (Oxycontin)    oxycodone and acetaminophen (Percocet)    hydrocodone and acetaminophen (Vicodin, Norco)    fentanyl patch (Duragesic)    hydromorphone (Dilaudid)    methadone  What do opioids do well?   Opioids are best for short-term pain after a surgery or injury. They also work well for cancer pain. Unlike other pain medicines, they do not harm the liver or kidney (though people with liver or kidney problems should use caution while taking opioids). They may help some people with long-lasting (chronic) pain.   What do opioids NOT do well?   Opioids never get rid of pain entirely, and they do not work well for most patients with chronic pain. Opioids do not reduce swelling, one of the causes of pain. They also don't work well for nerve pain.   Prescribed opioids aren't the best way to manage chronic pain.   Other ways to manage pain include:     ibuprofen or acetaminophen. You should always try this first.     acupuncture or massage, deep breathing, meditation, visual imagery, aromatherapy       use heat or ice at the pain site     physical therapy and exercise     stop smoking     see a counselor or therapist   Risks and side effects  Talk to your doctor before you start or decide to keep taking one of these medicines. Risks and side effects include:    Lowering your breathing rate enough to cause death    Overdose, including death, especially if taking higher than prescribed doses    Long-term opioid use    Worse depression symptoms; less pleasure in things you usually enjoy    Feeling tired or sluggish    Slower thoughts or cloudy thinking    Being more sensitive to pain over time; pain is harder to control    Trouble sleeping or restless sleep    Changes in hormone levels (for  example, less testosterone)    Changes in sex drive or ability to have sex    Constipation    Unsafe driving    Itching and sweating    Feeling dizzy    Nausea, vomiting and dry mouth  What else should I know about opioids?    When someone takes opioids for too long or too often, they become dependent. This means that if you stop or reduce the medicine too quickly, you will have withdrawal symptoms.    Dependence is not the same as addiction. Addiction is when people keep using a substance despite harm. This includes harm to the body, their mind or their relations with others. If you have a history of drug or alcohol abuse, taking opioids can cause a relapse.    Over time, opioids don't work as well. Most people will need higher and higher doses. The higher the dose, the more serious the side effects. We don't know the long-term effects of opioids.    People who have used opioids for a long time may have a lower quality of life, worse depression, higher levels of pain and more visits to doctors.    Never share your opioids with others; it is against the law. Be sure to store opioids in a secure place, locked if possible.Young children can easily swallow them and overdose.    You can overdose on opioids. Signs of overdose include decrease or loss of consciousnes, slowed breathing, trouble waking and blue lips. If someone is worried about overdose, they should call 911.     If you are at risk for overdose, you may get naloxone (Narcan), a medicine that reverses the effects of opioids. If you overdose, a friend or family member can give you Narcan while waiting for the ambulance. They need to know the signs of overdose and how to give Narcan.  While you're taking opioids:  Don't use alcohol or street drugs. Taking them together can cause death.  Don't take any of these medicines unless your doctor says its okay. Taking these with opioids can cause death.  Benzodiazepines (such as lorazepam or diazepam).  Muscle  relaxers (such as cyclobenzaprine)  sleeping pills  other opioids   Safe disposal of opioids  Find your area drug take-back program, your pharmacy mail-back program, buy a special disposal bag (such as Deterra) from your pharmacy or flush them down the toilet. Use the guidelines at www.fda.gov/drugs/resourcesforyou.  For informational purposes only. Not to replace the advice of your health care provider.   Copyright   2016 [facility]. All rights reserved. [employer] 694201 - Rev 02/18.

## 2018-12-01 NOTE — ED AVS SNAPSHOT
[facility]    [address] [address]    Phone:  [phone]                                       Sidney Pereira   MRN: [id number]    Department[facility]  [facility]   Date of Visit:  12/1/2018           After Visit Summary Signature Page     I have received my discharge instructions, and my questions have been answered. I have discussed any challenges I see with this plan with the nurse or doctor.    ..........................................................................................................................................  Patient/Patient Representative Signature      ..........................................................................................................................................  Patient Representative Print Name and Relationship to Patient    ..................................................               ................................................  Date                                   Time    ..........................................................................................................................................  Reviewed by Signature/Title    ...................................................              ..............................................  Date                                               Time          22EPIC Rev 08/18

## 2018-12-01 NOTE — ED PROVIDER NOTES
"  History     Chief Complaint   Patient presents with     Medication Refill     [facility] can not [facility] Morphine 15 mg SR. pt last took pain medication at 2200 yesterday and [facility] he is to come [facility] when he is \"feling symptoms of withdrawl\" pt reports that he starting to get \"stoimach cramps, diarrhea, cramping hands\" pt took his last vicoden today at 0600 also.      The history is provided by the patient. No  was used.     Sidney Pereira is a 58 year old male who with a longstanding [facility] for several [facility] obtained while serving in the .  He has been getting his [facility] by [facility], but did not receive his last dose.  He notes sweats, abdominal cramping, and HAs.  If he does not receive his medications by Monday he will call the [facility].      This has happened a few months ago as well.  Patient is very reliable and was given a short regimen of opioids at that time.      Problem List:    There are no active problems to display for this patient.       Past Medical History:    No past medical history on file.    Past Surgical History:    No past surgical history on file.    Family History:    No family history on file.    Social History:  Marital Status:   [2]  Social History   Substance Use Topics     Smoking status: Not on file     Smokeless tobacco: Not on file     Alcohol use Not on file        Medications:      Morphine Sulfate ER 15 MG T12A   HYDROcodone-acetaminophen (VICODIN) 5-500 MG per tablet   MORPHINE SULFATE PO   MORPHINE SULFATE PO         Review of Systems   Constitutional: Positive for diaphoresis and fever. Negative for chills.   HENT: Negative.    Respiratory: Negative.    Gastrointestinal: Positive for abdominal pain.   Neurological: Positive for headaches.       Physical Exam   BP: 146/89  Pulse: 97  Temp: 96.8  F (36  C)  Resp: 16  SpO2: 98 %      Physical Exam   Constitutional: He " is oriented to person, place, and time. He appears well-developed and well-nourished. No distress.   HENT:   Head: Normocephalic and atraumatic.   Cardiovascular: Normal rate.    Pulmonary/Chest: Effort normal.   Abdominal: He exhibits no distension.   Neurological: He is alert and oriented to person, place, and time.   Skin: Skin is warm. He is diaphoretic. No erythema.   Psychiatric: He has a normal mood and affect. His behavior is normal. Judgment and thought content normal.       ED Course     ED Course     Procedures         No results found for this or any previous visit (from the past 24 hour(s)).    Medications - No data to display    Assessments & Plan (with Medical Decision Making)     I have reviewed the nursing notes.    I have reviewed the findings, diagnosis, plan and need for follow up with the patient.      Discharge Medication List as of 12/1/2018  4:23 PM      START taking these medications    Details   Morphine Sulfate ER 15 MG T12A Take 15 mg by mouth 3 times daily Take 15 mg am, 15 mg afternoon, and 30 mg before bed, as previously prescribed, Disp-10 each, R-0, Local Print             Final diagnoses:   Opiate withdrawal (H)   57 yo male, opioid withdrawal.    -[facility], has not received yet  -will give enough of his normal Morphine dose to get through the weekend  -patient will call [facility] on Monday 12/1/2018   [facility]     Kameron Ivan PA-C  12/01/18 [id number]

## 2018-12-01 NOTE — ED TRIAGE NOTES
Pt presents today with c/o medication withdrawal from morphine. States he took his last dose at 2200 last night. And his last Vicodin today at 0600. States the [facility] but [facility] has not received it yet.

## 2019-03-06 ENCOUNTER — HOSPITAL ENCOUNTER (EMERGENCY)
Facility: HOSPITAL | Age: 59
Discharge: HOME | End: 2019-03-06
Attending: INTERNAL MEDICINE | Admitting: INTERNAL MEDICINE
Payer: MEDICARE

## 2019-03-06 VITALS
DIASTOLIC BLOOD PRESSURE: 102 MMHG | TEMPERATURE: 97.9 F | SYSTOLIC BLOOD PRESSURE: 158 MMHG | RESPIRATION RATE: 16 BRPM | OXYGEN SATURATION: 98 %

## 2019-03-06 DIAGNOSIS — H16.133 PHOTOKERATITIS OF BOTH EYES: ICD-10-CM

## 2019-03-06 PROCEDURE — 99283 EMERGENCY DEPT VISIT LOW MDM: CPT

## 2019-03-06 PROCEDURE — 99283 EMERGENCY DEPT VISIT LOW MDM: CPT | Mod: Z6 | Performed by: INTERNAL MEDICINE

## 2019-03-06 RX ORDER — TETRACAINE HYDROCHLORIDE 5 MG/ML
1-2 SOLUTION OPHTHALMIC ONCE
Status: DISCONTINUED | OUTPATIENT
Start: 2019-03-06 | End: 2019-03-06 | Stop reason: HOSPADM

## 2019-03-06 RX ORDER — ERYTHROMYCIN 5 MG/G
OINTMENT OPHTHALMIC ONCE
Status: DISCONTINUED | OUTPATIENT
Start: 2019-03-06 | End: 2019-03-06 | Stop reason: HOSPADM
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
## 2019-03-06 NOTE — ED AVS SNAPSHOT
[facility]  [address] [address]  Phone:  [phone]                                    Sidney Pereira   MRN: [id number]    Department:  [facility]   Date of Visit:  3/6/2019           After Visit Summary Signature Page    I have received my discharge instructions, and my questions have been answered. I have discussed any challenges I see with this plan with the nurse or doctor.    ..........................................................................................................................................  Patient/Patient Representative Signature      ..........................................................................................................................................  Patient Representative Print Name and Relationship to Patient    ..................................................               ................................................  Date                                   Time    ..........................................................................................................................................  Reviewed by Signature/Title    ...................................................              ..............................................  Date                                               Time          22EPIC Rev 08/18

## 2019-03-08 ASSESSMENT — ENCOUNTER SYMPTOMS
SHORTNESS OF BREATH: 0
EYE PAIN: 1
FEVER: 0
PHOTOPHOBIA: 1
EYE ITCHING: 0
ABDOMINAL PAIN: 0
EYE DISCHARGE: 0
EYE REDNESS: 1

## 2019-03-09 NOTE — ED PROVIDER NOTES
History     Chief Complaint   Patient presents with     Flash burn     The history is provided by the patient.   Eye Problem   Location:  Both eyes  Quality:  Burning  Severity:  Moderate  Onset quality:  Gradual  Timing:  Constant  Progression:  Worsening  Chronicity:  New  Context: UV exposure    Associated symptoms: photophobia and redness    Associated symptoms: no decreased vision, no discharge and no itching          Allergies:  Allergies   Allergen Reactions     Diazepam Itching     Valium      No Clinical Screening - See Comments      Tapchristianoca..       Problem List:    There are no active problems to display for this patient.       Past Medical History:    No past medical history on file.    Past Surgical History:    No past surgical history on file.    Family History:    No family history on file.    Social History:  Marital Status:   [2]  Social History     Tobacco Use     Smoking status: Not on file   Substance Use Topics     Alcohol use: Not on file     Drug use: Not on file        Medications:      Morphine Sulfate ER 15 MG T12A         Review of Systems   Constitutional: Negative for fever.   Eyes: Positive for photophobia, pain and redness. Negative for discharge, itching and visual disturbance.   Respiratory: Negative for shortness of breath.    Cardiovascular: Negative for chest pain.   Gastrointestinal: Negative for abdominal pain.   All other systems reviewed and are negative.      Physical Exam   BP: (!) 158/102  Heart Rate: 84  Temp: 97.9  F (36.6  C)  Resp: 16  SpO2: 98 %      Physical Exam   Constitutional: No distress.   HENT:   Head: Atraumatic.   Mouth/Throat: Oropharynx is clear and moist.   Eyes: EOM are normal. Pupils are equal, round, and reactive to light. No foreign body present in the right eye. No foreign body present in the left eye. Right conjunctiva is injected. Right conjunctiva has no hemorrhage. Left conjunctiva is injected. Left conjunctiva has no hemorrhage. No  scleral icterus. Right pupil is reactive. Left pupil is reactive.   Cardiovascular: Normal heart sounds and intact distal pulses.   Pulmonary/Chest: Breath sounds normal. No respiratory distress.   Abdominal: Soft. Bowel sounds are normal. There is no tenderness.   Musculoskeletal: He exhibits no edema or tenderness.   Skin: Skin is warm. No rash noted. He is not diaphoretic.       ED Course        Procedures                   No results found for this or any previous visit (from the past 24 hour(s)).    Medications - No data to display    Assessments & Plan (with Medical Decision Making)   photokeratitis due to welding  erythromicin eye oint + NSAIDS as needed  Return to ER if symptoms persisted    I have reviewed the nursing notes.    I have reviewed the findings, diagnosis, plan and need for follow up with the patient.         Medication List      There are no discharge medications for this visit.         Final diagnoses:   Photokeratitis of both eyes       3/5/2019   [facility]     Eugene Graham MD  03/08/19 5946

## 2019-03-19 ENCOUNTER — HOSPITAL ENCOUNTER (EMERGENCY)
Facility: HOSPITAL | Age: 59
Discharge: HOME | End: 2019-03-19
Attending: FAMILY MEDICINE | Admitting: FAMILY MEDICINE
Payer: MEDICARE

## 2019-03-19 VITALS
RESPIRATION RATE: 18 BRPM | OXYGEN SATURATION: 99 % | HEART RATE: 100 BPM | WEIGHT: 220 LBS | SYSTOLIC BLOOD PRESSURE: 160 MMHG | DIASTOLIC BLOOD PRESSURE: 112 MMHG | TEMPERATURE: 98.4 F

## 2019-03-19 DIAGNOSIS — G89.4 CHRONIC PAIN SYNDROME: ICD-10-CM

## 2019-03-19 DIAGNOSIS — F11.93 OPIATE WITHDRAWAL (H): ICD-10-CM

## 2019-03-19 PROCEDURE — 99283 EMERGENCY DEPT VISIT LOW MDM: CPT

## 2019-03-19 PROCEDURE — 99283 EMERGENCY DEPT VISIT LOW MDM: CPT | Mod: Z6 | Performed by: FAMILY MEDICINE

## 2019-03-19 RX ORDER — MORPHINE SULFATE 15 MG/1
TABLET, FILM COATED, EXTENDED RELEASE ORAL
Qty: 10 TABLET | Refills: 0 | Status: SHIPPED | OUTPATIENT
Start: 2019-03-19 | End: 2019-07-08

## 2019-03-19 SDOH — HEALTH STABILITY: MENTAL HEALTH: HOW OFTEN DO YOU HAVE A DRINK CONTAINING ALCOHOL?: NEVER

## 2019-03-19 ASSESSMENT — ENCOUNTER SYMPTOMS
GASTROINTESTINAL NEGATIVE: 1
NEUROLOGICAL NEGATIVE: 1
RESPIRATORY NEGATIVE: 1
ARTHRALGIAS: 1
BACK PAIN: 1
CONSTITUTIONAL NEGATIVE: 1
ENDOCRINE NEGATIVE: 1
CARDIOVASCULAR NEGATIVE: 1
EYES NEGATIVE: 1
NECK PAIN: 1
PSYCHIATRIC NEGATIVE: 1
HEMATOLOGIC/LYMPHATIC NEGATIVE: 1

## 2019-03-19 NOTE — ED AVS SNAPSHOT
[facility]  [address] [address]  Phone:  [phone]                                    Sidney Pereira   MRN: [id number]    Department:  [facility]   Date of Visit:  3/19/2019           After Visit Summary Signature Page    I have received my discharge instructions, and my questions have been answered. I have discussed any challenges I see with this plan with the nurse or doctor.    ..........................................................................................................................................  Patient/Patient Representative Signature      ..........................................................................................................................................  Patient Representative Print Name and Relationship to Patient    ..................................................               ................................................  Date                                   Time    ..........................................................................................................................................  Reviewed by Signature/Title    ...................................................              ..............................................  Date                                               Time          22EPIC Rev 08/18

## 2019-03-19 NOTE — ED PROVIDER NOTES
History     Chief Complaint   Patient presents with     Medication Refill     Wants short fill of Morphine 15 mg tabs. [facility] just [facility]. He took his last dose yesterday am. Pt has chronic pain from neck and  Low back surgeries and bilat ankle surgeries.          Sidney Pereira is a 58 year old male who presents w/ above concerns.    Allergies:  Allergies   Allergen Reactions     Diazepam Itching     Valium      No Clinical Screening - See Comments      Tapioca..       Problem List:    There are no active problems to display for this patient.       Past Medical History:    History reviewed. No pertinent past medical history.    Past Surgical History:    History reviewed. No pertinent surgical history.    Family History:    History reviewed. No pertinent family history.    Social History:  Marital Status:   [2]  Social History     Tobacco Use     Smoking status: Current Every Day Smoker     Packs/day: 1.00     Years: 40.00     Pack years: 40.00     Smokeless tobacco: Never Used   Substance Use Topics     Alcohol use: No     Frequency: Never     Drug use: No        Medications:      morphine (MS CONTIN) 15 MG CR tablet   Morphine Sulfate ER 15 MG T12A         Review of Systems   Constitutional: Negative.    HENT: Negative.    Eyes: Negative.    Respiratory: Negative.    Cardiovascular: Negative.    Gastrointestinal: Negative.    Endocrine: Negative.    Genitourinary: Negative.    Musculoskeletal: Positive for arthralgias, back pain and neck pain.   Skin: Negative.    Neurological: Negative.    Hematological: Negative.    Psychiatric/Behavioral: Negative.        Physical Exam   BP: (!) 160/112  Pulse: 100  Temp: 98.4  F (36.9  C)  Resp: 18  Weight: 99.8 kg (220 lb)  SpO2: 99 %      Physical Exam   Constitutional: He is oriented to person, place, and time. He appears well-developed and well-nourished. No distress.   HENT:   Head: Normocephalic and  atraumatic.   Eyes: Conjunctivae and EOM are normal. Pupils are equal, round, and reactive to light.   Cardiovascular: Normal rate, regular rhythm and normal heart sounds.   Pulmonary/Chest: Effort normal and breath sounds normal. No stridor. No respiratory distress. He has no wheezes. He has no rales. He exhibits no tenderness.   Neurological: He is alert and oriented to person, place, and time. No cranial nerve deficit.   Skin: Skin is warm and dry. He is not diaphoretic.       ED Course        Procedures              Critical Care time:               No results found for this or any previous visit (from the past 24 hour(s)).    Medications - No data to display    Assessments & Plan (with Medical Decision Making)     I have reviewed the nursing notes.    I have reviewed the findings, diagnosis, plan and need for follow up with the patient.  Rx for Morphine #10 tabs. Pt advised to f/u w/ PCP for further refills.  Caution sedation and constipation while taking. No alcohol or driving while taking.       Medication List      Modified    * Morphine Sulfate ER 15 MG T12a  15 mg, Oral, 3 TIMES DAILY, Take 15 mg am, 15 mg afternoon, and 30 mg before bed, as previously prescribed  What changed:  Another medication with the same name was added. Make sure you understand how and when to take each.     * morphine 15 MG CR tablet  Commonly known as:  MS CONTIN  1 tablet in am and noon and 2 tablets at night  What changed:  You were already taking a medication with the same name, and this prescription was added. Make sure you understand how and when to take each.         * This list has 2 medication(s) that are the same as other medications prescribed for you. Read the directions carefully, and ask your doctor or other care provider to review them with you.                Final diagnoses:   Opiate withdrawal (H)   Chronic pain syndrome       3/19/2019   [facility]     Alis Farris,   03/19/19 0736

## 2019-03-19 NOTE — ED NOTES
Pt to rm 2 in er, states needs to get refill of his pain meds as hasn't had since yesterday and is now going through withdrawal, having diarrhea and increasing pain and is unable to sleep. Rates pain at 9/10

## 2019-03-19 NOTE — ED NOTES
Pt given instruct via avs and has script for morphine. dc'd to home ambulatory, offers no other complaints or needs at this time

## 2019-05-07 ENCOUNTER — OFFICE VISIT (OUTPATIENT)
Dept: CHIROPRACTIC MEDICINE | Facility: OTHER | Age: 59
End: 2019-05-07
Attending: CHIROPRACTOR
Payer: MEDICARE

## 2019-05-07 DIAGNOSIS — M54.50 ACUTE BILATERAL LOW BACK PAIN WITHOUT SCIATICA: ICD-10-CM

## 2019-05-07 DIAGNOSIS — M99.02 SEGMENTAL AND SOMATIC DYSFUNCTION OF THORACIC REGION: ICD-10-CM

## 2019-05-07 DIAGNOSIS — M99.03 SEGMENTAL AND SOMATIC DYSFUNCTION OF LUMBAR REGION: Primary | ICD-10-CM

## 2019-05-07 PROCEDURE — 98940 CHIROPRACT MANJ 1-2 REGIONS: CPT | Mod: AT | Performed by: CHIROPRACTOR

## 2019-07-08 ENCOUNTER — HOSPITAL ENCOUNTER (EMERGENCY)
Facility: HOSPITAL | Age: 59
Discharge: HOME | End: 2019-07-08
Attending: INTERNAL MEDICINE | Admitting: INTERNAL MEDICINE
Payer: MEDICARE

## 2019-07-08 VITALS
OXYGEN SATURATION: 97 % | DIASTOLIC BLOOD PRESSURE: 101 MMHG | HEART RATE: 84 BPM | SYSTOLIC BLOOD PRESSURE: 160 MMHG | RESPIRATION RATE: 16 BRPM | TEMPERATURE: 98.1 F

## 2019-07-08 DIAGNOSIS — Z76.0 ENCOUNTER FOR MEDICATION REFILL: ICD-10-CM

## 2019-07-08 PROCEDURE — 99282 EMERGENCY DEPT VISIT SF MDM: CPT

## 2019-07-08 PROCEDURE — 99282 EMERGENCY DEPT VISIT SF MDM: CPT | Mod: Z6 | Performed by: INTERNAL MEDICINE

## 2019-07-08 RX ORDER — HYDROCODONE BITARTRATE AND ACETAMINOPHEN 5; 325 MG/1; MG/1
TABLET ORAL
COMMUNITY
Start: 2017-01-04 | End: 2019-07-08

## 2019-07-08 NOTE — ED NOTES
"Patient given written and verbal discharge instructions and patient verbalizes understanding. [facility], wanted documentation that he was here, states \"that's all I really wanted\".  "

## 2019-07-08 NOTE — DISCHARGE INSTRUCTIONS
Refilling Narcotic medication in Emergency room is not recommended , please contact [facility] or Your [facility]CP for refill.

## 2019-07-08 NOTE — ED NOTES
Patient ambulatory to ED room 2. Patient states that [facility] is a [facility] and is daily morphine for chronic neck and back pain. Patient states that [facility] was [facility] this past week, but with the holiday this was delayed. Patient thought that he would have enough to get him through, but he took his last pill yesterday. Patient is worried about going through withdrawals before getting his delivery. Patient states that this is not the first time that the [facility] did not [facility] his medications on time.

## 2019-07-08 NOTE — ED AVS SNAPSHOT
[facility]  [address] [address]  Phone:  [phone]                                    Sidney Pereira   MRN: [id number]    Department:  [facility]   Date of Visit:  7/8/2019           After Visit Summary Signature Page    I have received my discharge instructions, and my questions have been answered. I have discussed any challenges I see with this plan with the nurse or doctor.    ..........................................................................................................................................  Patient/Patient Representative Signature      ..........................................................................................................................................  Patient Representative Print Name and Relationship to Patient    ..................................................               ................................................  Date                                   Time    ..........................................................................................................................................  Reviewed by Signature/Title    ...................................................              ..............................................  Date                                               Time          22EPIC Rev 08/18

## 2019-07-11 ASSESSMENT — ENCOUNTER SYMPTOMS
ANAL BLEEDING: 0
DIZZINESS: 0
LIGHT-HEADEDNESS: 0
SLEEP DISTURBANCE: 0
WEAKNESS: 0
MYALGIAS: 0
VOICE CHANGE: 0
WHEEZING: 0
BLOOD IN STOOL: 0
PALPITATIONS: 0
CHILLS: 0
ABDOMINAL DISTENTION: 0
DYSURIA: 0
FREQUENCY: 0
NUMBNESS: 0
COLOR CHANGE: 0
FLANK PAIN: 0
VOMITING: 0
HEADACHES: 0
SHORTNESS OF BREATH: 0
CONFUSION: 0
FEVER: 0
CHEST TIGHTNESS: 0
ABDOMINAL PAIN: 0
COUGH: 0
NECK PAIN: 0
DIAPHORESIS: 0
NAUSEA: 0
BACK PAIN: 0

## 2019-07-11 NOTE — ED PROVIDER NOTES
"  History     Chief Complaint   Patient presents with     Medication Refill     [facility] and patient states that he is \"going through withdrawals\"     HPI  Sidney Pereira is a 58 year old male who came for refilling his narcotic prescription;     Allergies:  Allergies   Allergen Reactions     Diazepam Itching     Valium      Nicotine      GI reaction, dizziness - per [facility]     No Clinical Screening - See Comments      Ling..       Problem List:    There are no active problems to display for this patient.       Past Medical History:    History reviewed. No pertinent past medical history.    Past Surgical History:    History reviewed. No pertinent surgical history.    Family History:    History reviewed. No pertinent family history.    Social History:  Marital Status:   [2]  Social History     Tobacco Use     Smoking status: Current Every Day Smoker     Packs/day: 1.00     Years: 40.00     Pack years: 40.00     Smokeless tobacco: Never Used   Substance Use Topics     Alcohol use: No     Frequency: Never     Drug use: No        Medications:      aspirin (ASA) 81 MG tablet   Morphine Sulfate ER 15 MG T12A         Review of Systems   Constitutional: Negative for chills, diaphoresis and fever.   HENT: Negative for voice change.    Eyes: Negative for visual disturbance.   Respiratory: Negative for cough, chest tightness, shortness of breath and wheezing.    Cardiovascular: Negative for chest pain, palpitations and leg swelling.   Gastrointestinal: Negative for abdominal distention, abdominal pain, anal bleeding, blood in stool, nausea and vomiting.   Genitourinary: Negative for decreased urine volume, dysuria, flank pain and frequency.   Musculoskeletal: Negative for back pain, gait problem, myalgias and neck pain.   Skin: Negative for color change, pallor and rash.   Neurological: Negative for dizziness, syncope, weakness, light-headedness, numbness and headaches. "   Psychiatric/Behavioral: Negative for confusion, sleep disturbance and suicidal ideas.   All other systems reviewed and are negative.      Physical Exam   BP: (!) 160/101  Pulse: 84  Temp: 98.1  F (36.7  C)  Resp: 16  SpO2: 97 %      Physical Exam   Constitutional: No distress.   HENT:   Head: Atraumatic.   Mouth/Throat: Oropharynx is clear and moist.   Eyes: Pupils are equal, round, and reactive to light. No scleral icterus.   Cardiovascular: Normal heart sounds and intact distal pulses.   Pulmonary/Chest: Breath sounds normal. No respiratory distress.   Abdominal: Soft. Bowel sounds are normal. There is no tenderness.   Musculoskeletal: He exhibits no edema or tenderness.   Skin: Skin is warm. No rash noted. He is not diaphoretic.       ED Course        Procedures                 No results found for this or any previous visit (from the past 24 hour(s)).    Medications - No data to display    Assessments & Plan (with Medical Decision Making)   Came for refill his narcotic prescription  No sign or symptoms of withdrawal, multiple ER visit for the same requent  I explained to him that is not the proper way to refill narcotic prescription in ER and he need to [facility]  He understood and agreed.   I have reviewed the nursing notes.    I have reviewed the findings, diagnosis, plan and need for follow up with the patient.         Medication List      There are no discharge medications for this visit.         Final diagnoses:   Encounter for medication refill       7/8/2019   [facility]     Eugene Graham MD  07/11/19 0732

## 2019-10-16 ENCOUNTER — HOSPITAL ENCOUNTER (EMERGENCY)
Facility: HOSPITAL | Age: 59
Discharge: HOME | End: 2019-10-16
Attending: NURSE PRACTITIONER | Admitting: NURSE PRACTITIONER
Payer: MEDICARE

## 2019-10-16 VITALS
TEMPERATURE: 97.6 F | HEART RATE: 93 BPM | WEIGHT: 220 LBS | DIASTOLIC BLOOD PRESSURE: 95 MMHG | BODY MASS INDEX: 33.34 KG/M2 | HEIGHT: 68 IN | OXYGEN SATURATION: 98 % | SYSTOLIC BLOOD PRESSURE: 153 MMHG | RESPIRATION RATE: 16 BRPM

## 2019-10-16 DIAGNOSIS — J06.9 UPPER RESPIRATORY INFECTION: Primary | ICD-10-CM

## 2019-10-16 DIAGNOSIS — J06.9 UPPER RESPIRATORY TRACT INFECTION, UNSPECIFIED TYPE: ICD-10-CM

## 2019-10-16 PROCEDURE — G0463 HOSPITAL OUTPT CLINIC VISIT: HCPCS

## 2019-10-16 PROCEDURE — 99213 OFFICE O/P EST LOW 20 MIN: CPT | Mod: Z6 | Performed by: NURSE PRACTITIONER

## 2019-10-16 ASSESSMENT — MIFFLIN-ST. JEOR: SCORE: 1792.41

## 2019-10-16 NOTE — DISCHARGE INSTRUCTIONS
Continue drinking plenty of fluids, throat lozenges, try oral decongestants. Sip on warm beverages.    Contact any clinic you want to get a primary care provider.     Return to emergency department for worsening or concerning symptoms.

## 2019-10-16 NOTE — ED AVS SNAPSHOT
[facility]  [address] [address]  Phone:  [phone]                                    Sidney Pereira   MRN: [id number]    Department:  [facility]   Date of Visit:  10/16/2019           After Visit Summary Signature Page    I have received my discharge instructions, and my questions have been answered. I have discussed any challenges I see with this plan with the nurse or doctor.    ..........................................................................................................................................  Patient/Patient Representative Signature      ..........................................................................................................................................  Patient Representative Print Name and Relationship to Patient    ..................................................               ................................................  Date                                   Time    ..........................................................................................................................................  Reviewed by Signature/Title    ...................................................              ..............................................  Date                                               Time          22EPIC Rev 08/18

## 2019-10-16 NOTE — ED PROVIDER NOTES
"  History     Chief Complaint   Patient presents with     URI     one weak hx of URI/cough     HPI  Sidney Pereira is a 59 year old male PMHx of chronic sinusitis who presents to  for URI symptoms. Onset 1 week ago. He had intermittent fever of 102.1F which has resolved. He reports cough, nasal congestion and ear itchiness. He is coughing up white phlegm. Denies SOB, CP, N/V and sore throat. He has been taking throat lozenges, apple cider vinegar and  robitussin cough syrup. Symptoms were getting better then they got worse a couple of days ago.     Allergies:  Allergies   Allergen Reactions     Diazepam Itching     Valium      Nicotine      GI reaction, dizziness - per [facility]     No Clinical Screening - See Comments      Opheliaca..       Problem List:    There are no active problems to display for this patient.       Past Medical History:    No past medical history on file.    Past Surgical History:    No past surgical history on file.    Family History:    No family history on file.    Social History:  Marital Status:   [2]  Social History     Tobacco Use     Smoking status: Current Every Day Smoker     Packs/day: 1.00     Years: 40.00     Pack years: 40.00     Smokeless tobacco: Never Used   Substance Use Topics     Alcohol use: No     Frequency: Never     Drug use: No        Medications:    aspirin (ASA) 81 MG tablet  Morphine Sulfate ER 15 MG T12A      Review of Systems   HENT: Positive for congestion and ear pain (itchiness). Negative for sore throat and trouble swallowing.    Respiratory: Positive for cough. Negative for shortness of breath.    Cardiovascular: Negative for chest pain.   All other systems reviewed and are negative.      Physical Exam   BP: 153/95  Pulse: 93  Temp: 97.6  F (36.4  C)  Resp: 16  Height: 172.7 cm (5' 8\")  Weight: 99.8 kg (220 lb)(stated)  SpO2: 98 %      Physical Exam  Vitals signs and nursing note reviewed.   Constitutional:       Appearance: Normal appearance. He is " not ill-appearing or toxic-appearing.   HENT:      Head: Normocephalic and atraumatic.      Right Ear: Tympanic membrane normal.      Left Ear: Tympanic membrane normal.      Nose: Congestion present.      Mouth/Throat:      Pharynx: No oropharyngeal exudate or posterior oropharyngeal erythema.   Neck:      Musculoskeletal: Neck supple.   Cardiovascular:      Rate and Rhythm: Normal rate.      Heart sounds: Normal heart sounds.   Pulmonary:      Effort: Pulmonary effort is normal.      Breath sounds: Normal breath sounds. No wheezing, rhonchi or rales.   Lymphadenopathy:      Cervical: No cervical adenopathy.   Neurological:      Mental Status: He is alert.         ED Course        Procedures         No results found for this or any previous visit (from the past 24 hour(s)).    Medications - No data to display    Assessments & Plan (with Medical Decision Making)     I have reviewed the nursing notes.    I have reviewed the findings, diagnosis, plan and need for follow up with the patient.  Upper Respiratory Infection:  Bilateral lung sounds CTA.  Heart rate and rhythm regular.  Bilateral TMs pearly gray.  Vital signs stable with oxygen saturation of 98% on room air.   Discussed symptomatic treatment of URI including OTC decongestants, throat lozenges, sipping warm or cold beverages and pushing fluids. Discussed with patient to follow-up with PCP if no improvement in symptoms or return to emergency department for worsening or concerning symptoms.  Patient verbalized understanding and agreeable with plan of care.    Final diagnoses:   Upper respiratory infection       10/16/2019   [facility]     Radha Cortés CNP  10/17/19 0378

## 2019-10-17 ASSESSMENT — ENCOUNTER SYMPTOMS
TROUBLE SWALLOWING: 0
SHORTNESS OF BREATH: 0
SORE THROAT: 0
COUGH: 1

## 2020-07-15 ENCOUNTER — HOSPITAL ENCOUNTER (EMERGENCY)
Facility: HOSPITAL | Age: 60
Discharge: HOME | End: 2020-07-15
Attending: NURSE PRACTITIONER | Admitting: NURSE PRACTITIONER
Payer: MEDICARE

## 2020-07-15 ENCOUNTER — APPOINTMENT (OUTPATIENT)
Dept: ULTRASOUND IMAGING | Facility: HOSPITAL | Age: 60
End: 2020-07-15
Attending: NURSE PRACTITIONER
Payer: MEDICARE

## 2020-07-15 ENCOUNTER — APPOINTMENT (OUTPATIENT)
Dept: GENERAL RADIOLOGY | Facility: HOSPITAL | Age: 60
End: 2020-07-15
Attending: NURSE PRACTITIONER
Payer: MEDICARE

## 2020-07-15 VITALS
OXYGEN SATURATION: 97 % | HEART RATE: 94 BPM | RESPIRATION RATE: 16 BRPM | DIASTOLIC BLOOD PRESSURE: 99 MMHG | TEMPERATURE: 99 F | SYSTOLIC BLOOD PRESSURE: 169 MMHG

## 2020-07-15 DIAGNOSIS — M71.20 BAKER'S CYST OF KNEE: ICD-10-CM

## 2020-07-15 DIAGNOSIS — M25.462 EFFUSION OF LEFT KNEE: Primary | ICD-10-CM

## 2020-07-15 PROCEDURE — 73562 X-RAY EXAM OF KNEE 3: CPT | Mod: TC,LT

## 2020-07-15 PROCEDURE — 99212 OFFICE O/P EST SF 10 MIN: CPT | Mod: Z6 | Performed by: NURSE PRACTITIONER

## 2020-07-15 PROCEDURE — 93971 EXTREMITY STUDY: CPT | Mod: TC,LT

## 2020-07-15 PROCEDURE — G0463 HOSPITAL OUTPT CLINIC VISIT: HCPCS | Mod: 25

## 2020-07-15 ASSESSMENT — ENCOUNTER SYMPTOMS
WEAKNESS: 0
JOINT SWELLING: 1
ARTHRALGIAS: 1
NUMBNESS: 1

## 2020-07-15 NOTE — ED PROVIDER NOTES
"  History     Chief Complaint   Patient presents with     Knee Pain     left sided after hurting knee a month ago. states that he thinks it is a blood clot above his knee. denies unilateral swelling and states that swelling decreased after applying ice. hx of blood clot in leg 15 years ago. no anticoags      HPI  Sidney Pereira is a 59 year old male who presents ambulatory to  for left knee pain. He stepped wrong and twisted his left knee 1 month ago. He has been able to walk with minimal difficulty. He started having pain above his left knee that started today along with \"cold\" left foot. He is concerned about a blood clot to left leg as he had one to the same leg about 15 years ago with similar symptoms. Denies SOB, CP. No current blood thinner use.     Allergies:  Allergies   Allergen Reactions     Diazepam Itching     Valium      Nicotine      GI reaction, dizziness - per [facility]     No Clinical Screening - See Comments      Tapchristianoca..       Problem List:    There are no active problems to display for this patient.       Past Medical History:    History reviewed. No pertinent past medical history.    Past Surgical History:    History reviewed. No pertinent surgical history.    Family History:    No family history on file.    Social History:  Marital Status:   [2]  Social History     Tobacco Use     Smoking status: Current Every Day Smoker     Packs/day: 1.00     Years: 40.00     Pack years: 40.00     Smokeless tobacco: Never Used   Substance Use Topics     Alcohol use: No     Frequency: Never     Drug use: No        Medications:    aspirin (ASA) 81 MG tablet  HYDROcodone-Acetaminophen (VICODIN PO)  Morphine Sulfate ER 15 MG T12A          Review of Systems   Musculoskeletal: Positive for arthralgias and joint swelling.   Neurological: Positive for numbness. Negative for weakness.   All other systems reviewed and are negative.      Physical Exam   BP: 169/99  Pulse: 94  Temp: 99  F (37.2  C)  Resp: " 16  SpO2: 97 %      Physical Exam  Vitals signs and nursing note reviewed.   Constitutional:       Appearance: Normal appearance. He is not ill-appearing or toxic-appearing.   HENT:      Head: Normocephalic.   Eyes:      Pupils: Pupils are equal, round, and reactive to light.   Neck:      Musculoskeletal: Neck supple.   Cardiovascular:      Rate and Rhythm: Normal rate.   Pulmonary:      Effort: Pulmonary effort is normal.   Musculoskeletal:         General: No deformity or signs of injury.      Left knee: He exhibits swelling. He exhibits no deformity, no erythema, no LCL laxity and no MCL laxity. Tenderness found. Medial joint line tenderness noted.      Left upper leg: He exhibits no tenderness and no deformity.      Right lower leg: No edema.      Left lower leg: No edema.   Skin:     General: Skin is warm and dry.      Capillary Refill: Capillary refill takes less than 2 seconds.      Findings: No bruising or erythema.   Neurological:      Mental Status: He is alert and oriented to person, place, and time.         ED Course        Procedures               Results for orders placed or performed during the hospital encounter of 07/15/20 (from the past 24 hour(s))   XR Knee Left 3 Views    Narrative    PROCEDURE:  XR KNEE LT 3 VW    HISTORY: injured knee 1 month ago with no xrays done. Still has pain  and mild swelling    COMPARISON:  None.    TECHNIQUE:  3 views of the left knee were obtained.    FINDINGS:  No fracture or dislocation is identified. The joint spaces  are preserved. Mild degenerative osteophytes are seen at the medial  and lateral femoral articulations. There is a large knee effusion.      Impression    IMPRESSION: Large knee effusion. Mild osteoarthritic changes of the  knee      AMANDO SPENCER MD   US Lower Extremity Venous Duplex Left    Narrative    Exam:US LOWER EXTREMITY VENOUS DUPLEX LEFT    History: Left leg pain and swelling    Comparisons: None    Technique: Venous duplex  ultrasonography of the left lower extremity  was performed.     Findings: The common femoral vein, superficial femoral vein and  popliteal vein are fully compressible with spontaneous and augmentable  venous flow. A Baker's cyst is seen measuring 7 cm in maximal  diameter. There is a  knee effusion.           Impression    Impression: No evidence of deep venous thrombosis within the left  lower extremity.    AMANDO SPENCER MD       Medications - No data to display    Assessments & Plan (with Medical Decision Making)   Patient presented ambulatory to urgent care for concerns of a blood clot to left knee.  Patient twisted his left knee 1 month ago and had swelling at that time which has improved some.  He started having pain above his left knee which prompted his visit today as he has had a blood clot to this leg with similar symptoms.  Moderate swelling to left knee along with tenderness to palpation to medial joint line.  No LCL or MCL laxity.  No bruising or erythema to left thigh or knee.  X-ray negative for acute fracture or dislocation of left knee but does show a large knee effusion.  Ultrasound negative for DVT, positive for Baker's cyst.  Discussed findings with patient.  He is negative for blood clot but does have a left knee effusion and Baker's cyst that was visualized on ultrasound.  Ace wrap applied in urgent care.  Orthopedic referral placed to [facility] per patient's choice for reevaluation.  Advised him to apply ice, elevate affected extremity and take ibuprofen or Tylenol as needed for the pain.  Patient notes he is still able to ambulate with minimal difficulty.  Return to emergency department for worsening concerning symptoms.  Patient verbalized understanding.    I have reviewed the nursing notes.    I have reviewed the findings, diagnosis, plan and need for follow up with the patient.      New Prescriptions    No medications on file       Final diagnoses:   Effusion of left  knee   Cintron's cyst of knee       7/15/2020   [facility]     Mpofu, Prudence, CNP  07/17/20 3933

## 2020-07-15 NOTE — ED AVS SNAPSHOT
[facility]  [address]  [place] [address]  Phone:  [phone]                                    Sidney Pereira   MRN: [id number]    Department:  [facility]   Date of Visit:  7/15/2020           After Visit Summary Signature Page    I have received my discharge instructions, and my questions have been answered. I have discussed any challenges I see with this plan with the nurse or doctor.    ..........................................................................................................................................  Patient/Patient Representative Signature      ..........................................................................................................................................  Patient Representative Print Name and Relationship to Patient    ..................................................               ................................................  Date                                   Time    ..........................................................................................................................................  Reviewed by Signature/Title    ...................................................              ..............................................  Date                                               Time          22EPIC Rev 08/18

## 2020-07-15 NOTE — ED TRIAGE NOTES
Patient presents today with c/o left knee pain.   Ongoing for 2 weeks.   Reports 3 weeks ago twisted leg while walking outside. (no falls)  8/10.   Swelling / pain present / numbness and tingling (unsure if new has hx of issue)  Denies redness, warmth.   Reports left foot feeling cold.   Hx of DVT in left leg 15 years ago.   Not on blood thinners.   Morphine TID, Vicodin TID - help some.

## 2020-07-15 NOTE — DISCHARGE INSTRUCTIONS
Use ace wrap, apply ice and elevate your knee. Continue taking home medications as needed for pain.     Schedule a follow up appointment with Orthopedics for reevaluation.    Return to emergency department for worsening or concerning symptoms.

## 2020-07-17 ENCOUNTER — MEDICAL CORRESPONDENCE (OUTPATIENT)
Dept: MRI IMAGING | Facility: HOSPITAL | Age: 60
End: 2020-07-17

## 2020-07-28 ENCOUNTER — HOSPITAL ENCOUNTER (OUTPATIENT)
Dept: MRI IMAGING | Facility: HOSPITAL | Age: 60
Discharge: HOME | End: 2020-07-28
Attending: SPECIALIST | Admitting: SPECIALIST
Payer: MEDICARE

## 2020-07-28 DIAGNOSIS — M25.462 PAIN AND SWELLING OF KNEE, LEFT: ICD-10-CM

## 2020-07-28 DIAGNOSIS — M25.562 PAIN AND SWELLING OF KNEE, LEFT: ICD-10-CM

## 2020-07-28 PROCEDURE — 73721 MRI JNT OF LWR EXTRE W/O DYE: CPT | Mod: TC,LT

## 2020-08-06 ENCOUNTER — OFFICE VISIT (OUTPATIENT)
Dept: FAMILY MEDICINE | Facility: OTHER | Age: 60
End: 2020-08-06
Attending: INTERNAL MEDICINE
Payer: MEDICARE

## 2020-08-06 ENCOUNTER — NURSE TRIAGE (OUTPATIENT)
Dept: INTERNAL MEDICINE | Facility: OTHER | Age: 60
End: 2020-08-06

## 2020-08-06 DIAGNOSIS — R05.9 COUGH: Primary | ICD-10-CM

## 2020-08-06 DIAGNOSIS — R06.02 SOB (SHORTNESS OF BREATH): ICD-10-CM

## 2020-08-06 PROCEDURE — U0003 INFECTIOUS AGENT DETECTION BY NUCLEIC ACID (DNA OR RNA); SEVERE ACUTE RESPIRATORY SYNDROME CORONAVIRUS 2 (SARS-COV-2) (CORONAVIRUS DISEASE [COVID-19]), AMPLIFIED PROBE TECHNIQUE, MAKING USE OF HIGH THROUGHPUT TECHNOLOGIES AS DESCRIBED BY CMS-2020-01-R: HCPCS | Mod: ZL | Performed by: INTERNAL MEDICINE

## 2020-08-06 NOTE — TELEPHONE ENCOUNTER
Patient scheduled for covid test today. Patient reports having a fever, cough, headache, chills, diarrhea, fatigue and lethargic.    Reason for Disposition    [1] COVID-19 infection suspected by caller or triager AND [2] mild symptoms (cough, fever, or others) AND [3] no complications or SOB    Additional Information    Negative: SEVERE difficulty breathing (e.g., struggling for each breath, speaks in single words)    Negative: Difficult to awaken or acting confused (e.g., disoriented, slurred speech)    Negative: Bluish (or gray) lips or face now    Negative: Shock suspected (e.g., cold/pale/clammy skin, too weak to stand, low BP, rapid pulse)    Negative: Sounds like a life-threatening emergency to the triager    Negative: [1] COVID-19 exposure AND [2] no symptoms    Negative: COVID-19 and Breastfeeding, questions about    Negative: [1] Adult with possible COVID-19 symptoms AND [2] triager concerned about severity of symptoms or other causes    Negative: SEVERE or constant chest pain or pressure (Exception: mild central chest pain, present only when coughing)    Negative: MODERATE difficulty breathing (e.g., speaks in phrases, SOB even at rest, pulse 100-120)    Negative: Patient sounds very sick or weak to the triager    Negative: MILD difficulty breathing (e.g., minimal/no SOB at rest, SOB with walking, pulse <100)    Negative: Chest pain or pressure    Negative: Fever > 103 F (39.4 C)    Negative: [1] Fever > 101 F (38.3 C) AND [2] age > 60    Negative: [1] Fever > 100.0 F (37.8 C) AND [2] bedridden (e.g., nursing home patient, CVA, chronic illness, recovering from surgery)    Negative: HIGH RISK patient (e.g., age > 64 years, diabetes, heart or lung disease, weak immune system)    Negative: Fever present > 3 days (72 hours)    Negative: [1] Fever returns after gone for over 24 hours AND [2] symptoms worse or not improved    Negative: [1] Continuous (nonstop) coughing interferes with work or school AND [2] no  "improvement using cough treatment per protocol    Answer Assessment - Initial Assessment Questions  1. COVID-19 DIAGNOSIS: \"Who made your Coronavirus (COVID-19) diagnosis?\" \"Was it confirmed by a positive lab test?\" If not diagnosed by a HCP, ask \"Are there lots of cases (community spread) where you live?\" (See [facility] health department website, if unsure)      Not diagnosed  2. ONSET: \"When did the COVID-19 symptoms start?\"       Two days ago  3. WORST SYMPTOM: \"What is your worst symptom?\" (e.g., cough, fever, shortness of breath, muscle aches)      Lethargic, slow breathing  4. COUGH: \"Do you have a cough?\" If so, ask: \"How bad is the cough?\"        Yes. Mild   5. FEVER: \"Do you have a fever?\" If so, ask: \"What is your temperature, how was it measured, and when did it start?\"      Yes. 99.7. oral  6. RESPIRATORY STATUS: \"Describe your breathing?\" (e.g., shortness of breath, wheezing, unable to speak)       Slower.  7. BETTER-SAME-WORSE: \"Are you getting better, staying the same or getting worse compared to yesterday?\"  If getting worse, ask, \"In what way?\"      same  8. HIGH RISK DISEASE: \"Do you have any chronic medical problems?\" (e.g., asthma, heart or lung disease, weak immune system, etc.)      No  9. PREGNANCY: \"Is there any chance you are pregnant?\" \"When was your last menstrual period?\"      NA  10. OTHER SYMPTOMS: \"Do you have any other symptoms?\"  (e.g., chills, fatigue, headache, loss of smell or taste, muscle pain, sore throat)        Diarrhea, chills, fatigue, headache.    Protocols used: CORONAVIRUS (COVID-19) DIAGNOSED OR [id number] 5.16.20      "

## 2020-08-08 LAB
SARS-COV-2 RNA SPEC QL NAA+PROBE: NOT DETECTED
SPECIMEN SOURCE: NORMAL

## 2021-08-16 ENCOUNTER — HOSPITAL ENCOUNTER (EMERGENCY)
Facility: HOSPITAL | Age: 61
Discharge: HOME | End: 2021-08-16
Attending: NURSE PRACTITIONER | Admitting: NURSE PRACTITIONER
Payer: MEDICARE

## 2021-08-16 ENCOUNTER — APPOINTMENT (OUTPATIENT)
Dept: GENERAL RADIOLOGY | Facility: HOSPITAL | Age: 61
End: 2021-08-16
Attending: NURSE PRACTITIONER
Payer: MEDICARE

## 2021-08-16 VITALS
TEMPERATURE: 98.1 F | HEART RATE: 90 BPM | SYSTOLIC BLOOD PRESSURE: 158 MMHG | RESPIRATION RATE: 16 BRPM | OXYGEN SATURATION: 98 % | DIASTOLIC BLOOD PRESSURE: 89 MMHG

## 2021-08-16 DIAGNOSIS — M25.332 DISI (DORSAL INTERCALATED SEGMENT INSTABILITY), LEFT: Primary | ICD-10-CM

## 2021-08-16 DIAGNOSIS — M25.532 LEFT WRIST PAIN: ICD-10-CM

## 2021-08-16 DIAGNOSIS — S69.92XA WRIST INJURY, LEFT, INITIAL ENCOUNTER: ICD-10-CM

## 2021-08-16 PROCEDURE — 29125 APPL SHORT ARM SPLINT STATIC: CPT

## 2021-08-16 PROCEDURE — 29125 APPL SHORT ARM SPLINT STATIC: CPT | Performed by: NURSE PRACTITIONER

## 2021-08-16 PROCEDURE — 271N000006 HC CAST/SPLINT FIBERGLASS

## 2021-08-16 PROCEDURE — 73110 X-RAY EXAM OF WRIST: CPT | Mod: LT

## 2021-08-16 PROCEDURE — 999N000104 HC STATISTIC NO CHARGE

## 2021-08-16 RX ORDER — CELECOXIB 50 MG/1
60 CAPSULE ORAL DAILY
COMMUNITY
End: 2022-12-28

## 2021-08-16 ASSESSMENT — ENCOUNTER SYMPTOMS
COLOR CHANGE: 0
WOUND: 0
EYES NEGATIVE: 1
MYALGIAS: 0
FEVER: 0
JOINT SWELLING: 1
ARTHRALGIAS: 1
PALPITATIONS: 0
DIZZINESS: 0
WEAKNESS: 1
GASTROINTESTINAL NEGATIVE: 1
RESPIRATORY NEGATIVE: 1
NUMBNESS: 1

## 2021-08-16 NOTE — ED TRIAGE NOTES
Pt presents with left wrist pain. Reports he was pulling nails when something popped and snapped. Hx of a fracture. Fingers and thumb are numb and slight ROM. Incident happened 3 weeks ago. Pt has not been taking any otc meds for pain. Came in with an ace wrap on.

## 2021-08-16 NOTE — ED PROVIDER NOTES
History     Chief Complaint   Patient presents with     Wrist Pain     c/o lt wrist pain x 3 weeks, notes injury     HPI  Sidney Pereira is a 60 year old male who presents with left wrist pain. Three weeks ago patient was tearing down a wall and pulling spikes out of the wall. While pulling out a spike he felt a pop in his left wrist followed by pain. He has continued to use his left wrist for the past three weeks and the pain has been persistent. He has been controlling the pain with his current pain medications which include morphine, norco, and celebrex. He has c/o limited ROM in his left wrist, swelling, mild numbness in his fingers, weakness, and tenderness. He denies radiation of the pain into his arm or hand, redness, or bruising. He states that he fractured his left wrist and arm in 1997.     Allergies:  Allergies   Allergen Reactions     Diazepam Itching     Valium      Nicotine      GI reaction, dizziness - per [facility]     Other [No Clinical Screening - See Comments]      Tapioca..       Problem List:    There are no problems to display for this patient.       Past Medical History:    History reviewed. No pertinent past medical history.    Past Surgical History:    History reviewed. No pertinent surgical history.    Family History:    History reviewed. No pertinent family history.    Social History:  Marital Status:   [2]  Social History     Tobacco Use     Smoking status: Current Every Day Smoker     Packs/day: 1.00     Years: 40.00     Pack years: 40.00     Smokeless tobacco: Never Used   Substance Use Topics     Alcohol use: No     Drug use: No        Medications:    aspirin (ASA) 81 MG tablet  celecoxib (CELEBREX) 50 MG capsule  HYDROcodone-Acetaminophen (VICODIN PO)  Morphine Sulfate ER 15 MG T12A          Review of Systems   Constitutional: Negative for fever.   HENT: Negative.    Eyes: Negative.    Respiratory: Negative.    Cardiovascular: Negative for chest pain and palpitations.    Gastrointestinal: Negative.    Genitourinary: Negative.    Musculoskeletal: Positive for arthralgias and joint swelling. Negative for myalgias.        Pain and swelling of left wrist   Skin: Negative for color change and wound.   Neurological: Positive for weakness and numbness. Negative for dizziness.        Left hand weakness and numbness in fingers   All other systems reviewed and are negative.      Physical Exam   BP: 158/89  Pulse: 90  Temp: 98.1  F (36.7  C)  Resp: 16  SpO2: 98 %      Physical Exam  Vitals and nursing note reviewed.   Constitutional:       Appearance: Normal appearance.   HENT:      Head: Normocephalic and atraumatic.      Nose: Nose normal.   Eyes:      Conjunctiva/sclera: Conjunctivae normal.      Pupils: Pupils are equal, round, and reactive to light.   Cardiovascular:      Rate and Rhythm: Normal rate and regular rhythm.      Pulses: Normal pulses.   Pulmonary:      Effort: Pulmonary effort is normal.   Musculoskeletal:         General: Swelling, tenderness and signs of injury present.      Right wrist: Normal.      Left wrist: Swelling, tenderness and bony tenderness present. No crepitus. Decreased range of motion. Normal pulse.      Left hand: Decreased strength of finger abduction and wrist extension. Normal sensation. Normal capillary refill. Normal pulse.      Cervical back: Normal range of motion and neck supple.      Comments: Point tenderness over scapholunate joint   Skin:     General: Skin is warm and dry.      Capillary Refill: Capillary refill takes less than 2 seconds.      Findings: No bruising or erythema.   Neurological:      General: No focal deficit present.      Mental Status: He is alert and oriented to person, place, and time.   Psychiatric:         Mood and Affect: Mood normal.         Behavior: Behavior normal.         ED Course        [facility]    Splint Application    Date/Time: 8/16/2021 5:45 PM  Performed by: Andrew Mckeon  Authorized by:  Mpofu, Prudence, CNP       PRE-PROCEDURE DETAILS     Sensation:  Normal    Skin color:  Pink    PROCEDURE DETAILS     Laterality:  Left    Location:  Wrist    Wrist:  L wrist    Splint type:  Volar short arm    Supplies:  Cotton padding, Ortho-Glass, elastic bandage and plaster    POST PROCEDURE DETAILS     Pain:  Improved    Sensation:  Normal    Skin color:  Pink    Patient tolerance of procedure:  Patient tolerated the procedure well with no immediate complications    PROCEDURE   Patient Tolerance:  Patient tolerated the procedure well with no immediate complications                    Results for orders placed or performed during the hospital encounter of 08/16/21 (from the past 24 hour(s))   XR Wrist Left G/E 3 Views    Narrative    Exam: XR WRIST LEFT G/E 3 VIEWS     History:Male, age 60 years, heard a pop and snap when pulling nails  out. pain and numbness.    Comparison:  None    Technique: Four views are submitted.    Findings: Bones are normally mineralized. There is dorsal angulation  of the lunate suggesting dorsal intercalated instability. No distinct  evidence of an acute fracture. Erosive changes are seen in the distal  aspects of the scaphoid.           Impression    Impression:  Dorsal angulation of the lunate suggesting dorsal intercalated  instability.    Slight widening of the scapholunate joint suggesting injury to the  scapholunate ligament. It is unclear if this is an acute or chronic  finding. MRI would better characterize.    Scattered degenerative changes of the wrist and hand.    SHERRY DONG MD         SYSTEM ID:  [id number]       Medications - No data to display    Assessments & Plan (with Medical Decision Making)     I have reviewed the nursing notes.    Patient presents with left wrist pain after injury three weeks ago. Left wrist x-ray shows slight widening of the scapholunate joint suggesting injury to the scapholunate ligament. A volar splint was applied to patients left wrist.  Patient will continue to control pain with current chronic pain medications. Referral placed to orthopedics. Advised patient to return to ED/UC if pain worsens.     I have reviewed the findings, diagnosis, plan and need for follow up with the patient.    Discharge Medication List as of 8/16/2021  5:52 PM          Final diagnoses:   Disi (dorsal intercalated segment instability), left   Left wrist pain   Wrist injury, left, initial encounter     Andrew Mckeon DNP Student    8/16/2021   [facility]      I have seen this patient with the student. The student documented the visit and I have edited and verified the history, physical examination, assessment and plan. The examination and medical decision making was performed by me.      Radha Cortés, CNP  08/17/21 0913

## 2021-08-16 NOTE — DISCHARGE INSTRUCTIONS
Keep splint in place.  Continue taking pain medication as needed.    [facility].    Return to emergency department for any concerning symptoms.

## 2021-09-16 ENCOUNTER — OFFICE VISIT (OUTPATIENT)
Dept: CHIROPRACTIC MEDICINE | Facility: OTHER | Age: 61
End: 2021-09-16
Attending: CHIROPRACTOR
Payer: MEDICARE

## 2021-09-16 DIAGNOSIS — M99.03 SEGMENTAL AND SOMATIC DYSFUNCTION OF LUMBAR REGION: Primary | ICD-10-CM

## 2021-09-16 DIAGNOSIS — M99.02 SEGMENTAL AND SOMATIC DYSFUNCTION OF THORACIC REGION: ICD-10-CM

## 2021-09-16 DIAGNOSIS — M54.50 ACUTE BILATERAL LOW BACK PAIN WITHOUT SCIATICA: ICD-10-CM

## 2021-09-16 PROCEDURE — 98940 CHIROPRACT MANJ 1-2 REGIONS: CPT | Mod: AT | Performed by: CHIROPRACTOR

## 2022-06-23 ENCOUNTER — HOSPITAL ENCOUNTER (EMERGENCY)
Facility: HOSPITAL | Age: 62
Discharge: LEFT BEFORE DISCHARGE | End: 2022-06-23
Attending: EMERGENCY MEDICINE | Admitting: EMERGENCY MEDICINE
Payer: COMMERCIAL

## 2022-06-23 VITALS
HEART RATE: 89 BPM | SYSTOLIC BLOOD PRESSURE: 156 MMHG | OXYGEN SATURATION: 97 % | RESPIRATION RATE: 20 BRPM | TEMPERATURE: 98 F | DIASTOLIC BLOOD PRESSURE: 94 MMHG

## 2022-06-23 DIAGNOSIS — I10 HYPERTENSION, UNSPECIFIED TYPE: ICD-10-CM

## 2022-06-23 PROCEDURE — 999N000104 HC STATISTIC NO CHARGE

## 2022-06-23 NOTE — ED PROVIDER NOTES
ED Course as of 06/23/22 0131   Thu Jun 23, 2022   0129 Patient vacated the ER prior to my assessment.  The area was searched and the patient was no longer on the premises.  Patient was not seen by the ER physician as the patient vacated the ER.        6/23/2022   [facility]     Epi Whipple MD  06/23/22 0131

## 2022-06-23 NOTE — ED TRIAGE NOTES
"Pt reports he checked his blood pressure at home about an hour ago and it was \"170 something over 100 something\". Pt states \"I am just concerned about my blood pressure\". [facility]     Armando Engle, MSN, RN on 6/23/2022 at 12:49 AM         "

## 2022-07-21 ENCOUNTER — HOSPITAL ENCOUNTER (EMERGENCY)
Facility: HOSPITAL | Age: 62
Discharge: HOME | End: 2022-07-21
Attending: EMERGENCY MEDICINE | Admitting: EMERGENCY MEDICINE
Payer: COMMERCIAL

## 2022-07-21 ENCOUNTER — APPOINTMENT (OUTPATIENT)
Dept: CT IMAGING | Facility: HOSPITAL | Age: 62
End: 2022-07-21
Attending: EMERGENCY MEDICINE
Payer: COMMERCIAL

## 2022-07-21 ENCOUNTER — APPOINTMENT (OUTPATIENT)
Dept: GENERAL RADIOLOGY | Facility: HOSPITAL | Age: 62
End: 2022-07-21
Attending: EMERGENCY MEDICINE
Payer: COMMERCIAL

## 2022-07-21 VITALS
OXYGEN SATURATION: 96 % | SYSTOLIC BLOOD PRESSURE: 120 MMHG | TEMPERATURE: 98.9 F | HEART RATE: 76 BPM | DIASTOLIC BLOOD PRESSURE: 89 MMHG | RESPIRATION RATE: 14 BRPM

## 2022-07-21 DIAGNOSIS — R07.9 CHEST PAIN, UNSPECIFIED TYPE: Primary | ICD-10-CM

## 2022-07-21 LAB
ANION GAP SERPL CALCULATED.3IONS-SCNC: 10 MMOL/L (ref 3–14)
BASOPHILS # BLD AUTO: 0.1 10E3/UL (ref 0–0.2)
BASOPHILS NFR BLD AUTO: 1 %
BUN SERPL-MCNC: 13 MG/DL (ref 7–30)
CALCIUM SERPL-MCNC: 8.8 MG/DL (ref 8.5–10.1)
CHLORIDE BLD-SCNC: 102 MMOL/L (ref 94–109)
CO2 SERPL-SCNC: 27 MMOL/L (ref 20–32)
CREAT SERPL-MCNC: 0.78 MG/DL (ref 0.66–1.25)
D DIMER PPP FEU-MCNC: 0.49 UG/ML FEU (ref 0–0.5)
EOSINOPHIL # BLD AUTO: 0.2 10E3/UL (ref 0–0.7)
EOSINOPHIL NFR BLD AUTO: 2 %
ERYTHROCYTE [DISTWIDTH] IN BLOOD BY AUTOMATED COUNT: 12.6 % (ref 10–15)
FLUAV RNA SPEC QL NAA+PROBE: NEGATIVE
FLUBV RNA RESP QL NAA+PROBE: NEGATIVE
GFR SERPL CREATININE-BSD FRML MDRD: >90 ML/MIN/1.73M2
GLUCOSE BLD-MCNC: 114 MG/DL (ref 70–99)
HCT VFR BLD AUTO: 46.3 % (ref 40–53)
HGB BLD-MCNC: 16.6 G/DL (ref 13.3–17.7)
HOLD SPECIMEN: NORMAL
IMM GRANULOCYTES # BLD: 0 10E3/UL
IMM GRANULOCYTES NFR BLD: 0 %
LYMPHOCYTES # BLD AUTO: 3.5 10E3/UL (ref 0.8–5.3)
LYMPHOCYTES NFR BLD AUTO: 39 %
MCH RBC QN AUTO: 33 PG (ref 26.5–33)
MCHC RBC AUTO-ENTMCNC: 35.9 G/DL (ref 31.5–36.5)
MCV RBC AUTO: 92 FL (ref 78–100)
MONOCYTES # BLD AUTO: 0.8 10E3/UL (ref 0–1.3)
MONOCYTES NFR BLD AUTO: 8 %
NEUTROPHILS # BLD AUTO: 4.6 10E3/UL (ref 1.6–8.3)
NEUTROPHILS NFR BLD AUTO: 50 %
NRBC # BLD AUTO: 0 10E3/UL
NRBC BLD AUTO-RTO: 0 /100
PLATELET # BLD AUTO: 205 10E3/UL (ref 150–450)
POTASSIUM BLD-SCNC: 2.8 MMOL/L (ref 3.4–5.3)
RBC # BLD AUTO: 5.03 10E6/UL (ref 4.4–5.9)
RSV RNA SPEC NAA+PROBE: NEGATIVE
SARS-COV-2 RNA RESP QL NAA+PROBE: NEGATIVE
SODIUM SERPL-SCNC: 139 MMOL/L (ref 133–144)
TROPONIN I SERPL HS-MCNC: 10 NG/L
TROPONIN I SERPL HS-MCNC: 8 NG/L
WBC # BLD AUTO: 9 10E3/UL (ref 4–11)

## 2022-07-21 PROCEDURE — 250N000013 HC RX MED GY IP 250 OP 250 PS 637: Performed by: EMERGENCY MEDICINE

## 2022-07-21 PROCEDURE — 80048 BASIC METABOLIC PNL TOTAL CA: CPT | Performed by: EMERGENCY MEDICINE

## 2022-07-21 PROCEDURE — 93005 ELECTROCARDIOGRAM TRACING: CPT

## 2022-07-21 PROCEDURE — 250N000011 HC RX IP 250 OP 636: Performed by: EMERGENCY MEDICINE

## 2022-07-21 PROCEDURE — 85025 COMPLETE CBC W/AUTO DIFF WBC: CPT | Performed by: EMERGENCY MEDICINE

## 2022-07-21 PROCEDURE — 99285 EMERGENCY DEPT VISIT HI MDM: CPT | Mod: 25

## 2022-07-21 PROCEDURE — C9803 HOPD COVID-19 SPEC COLLECT: HCPCS

## 2022-07-21 PROCEDURE — 93010 ELECTROCARDIOGRAM REPORT: CPT | Performed by: INTERNAL MEDICINE

## 2022-07-21 PROCEDURE — 85379 FIBRIN DEGRADATION QUANT: CPT | Performed by: EMERGENCY MEDICINE

## 2022-07-21 PROCEDURE — 36415 COLL VENOUS BLD VENIPUNCTURE: CPT | Performed by: EMERGENCY MEDICINE

## 2022-07-21 PROCEDURE — 71045 X-RAY EXAM CHEST 1 VIEW: CPT

## 2022-07-21 PROCEDURE — 87637 SARSCOV2&INF A&B&RSV AMP PRB: CPT | Performed by: EMERGENCY MEDICINE

## 2022-07-21 PROCEDURE — 99284 EMERGENCY DEPT VISIT MOD MDM: CPT | Performed by: EMERGENCY MEDICINE

## 2022-07-21 PROCEDURE — 71275 CT ANGIOGRAPHY CHEST: CPT

## 2022-07-21 PROCEDURE — 84484 ASSAY OF TROPONIN QUANT: CPT | Performed by: EMERGENCY MEDICINE

## 2022-07-21 RX ORDER — CLONIDINE HYDROCHLORIDE 0.1 MG/1
0.2 TABLET ORAL ONCE
Status: COMPLETED | OUTPATIENT
Start: 2022-07-21 | End: 2022-07-21

## 2022-07-21 RX ORDER — POTASSIUM CHLORIDE 1.5 G/1.58G
40 POWDER, FOR SOLUTION ORAL ONCE
Status: COMPLETED | OUTPATIENT
Start: 2022-07-21 | End: 2022-07-21

## 2022-07-21 RX ORDER — IOPAMIDOL 755 MG/ML
72 INJECTION, SOLUTION INTRAVASCULAR ONCE
Status: COMPLETED | OUTPATIENT
Start: 2022-07-21 | End: 2022-07-21

## 2022-07-21 RX ADMIN — IOPAMIDOL 72 ML: 755 INJECTION, SOLUTION INTRAVENOUS at 18:32

## 2022-07-21 RX ADMIN — CLONIDINE HYDROCHLORIDE 0.2 MG: 0.1 TABLET ORAL at 16:48

## 2022-07-21 RX ADMIN — POTASSIUM CHLORIDE 40 MEQ: 1.5 POWDER, FOR SOLUTION ORAL at 17:45

## 2022-07-21 NOTE — ED TRIAGE NOTES
"    Pt states he has been SOB for 3 days that is worse with activity. Pt states he has been having \"chest twinges\" since yesterday. Fiance with him is concerned about his BP over the last 2 months. Pt states c/o medial sharp chest pains that last from a few seconds to 1 minutes. States sometimes he gets numbness in his left arm or right arm. States he feels sweaty at times during the chest pains.  "

## 2022-07-21 NOTE — ED PROVIDER NOTES
History     Chief Complaint   Patient presents with     Chest Pain     HPI  Sidney Pereira is a 61 year old male who presents with report of chest pain on and off for couple weeks, lasting a minute at times, concerned about blood pressure, no pleurisy, duration ongoing.  Character system.  No other associated symptoms.  Takes hydrochlorothiazide for hypertension.    Allergies:  Allergies   Allergen Reactions     Diazepam Itching     Valium      Nicotine      GI reaction, dizziness - per [facility]     Other [No Clinical Screening - See Comments]      Tapioca..       Problem List:    There are no problems to display for this patient.       Past Medical History: Notes no known coronary artery disease history  No past medical history on file.    Past Surgical History:    No past surgical history on file.    Family History: Denies family history of coronary artery disease  No family history on file.    Social History: Quit smoking 24 hours ago per report  Marital Status:   [2]  Social History     Tobacco Use     Smoking status: Current Every Day Smoker     Packs/day: 1.00     Years: 40.00     Pack years: 40.00     Smokeless tobacco: Never Used   Substance Use Topics     Alcohol use: No     Drug use: No        Medications:    aspirin (ASA) 81 MG tablet  celecoxib (CELEBREX) 50 MG capsule  HYDROcodone-Acetaminophen (VICODIN PO)  Morphine Sulfate ER 15 MG T12A      Review of Systems   Respiratory denies.  Cardiovascular per HPI.  GI denies.   denies.  Heme denies.  Remainder of complete 10 point review of systems negative.      Physical Exam   BP: 141/93  Pulse: 83  Temp: 98.9  F (37.2  C)  Resp: 16  SpO2: 96 %      Physical Exam  Constitutional:       Appearance: He is well-developed.   HENT:      Head: Normocephalic and atraumatic.   Eyes:      Pupils: Pupils are equal, round, and reactive to light.   Cardiovascular:      Rate and Rhythm: Normal rate.      Heart sounds: Normal heart sounds.   Pulmonary:       Effort: Pulmonary effort is normal. No tachypnea or respiratory distress.      Breath sounds: Normal breath sounds.   Abdominal:      Palpations: Abdomen is soft.   Musculoskeletal:         General: Normal range of motion.      Cervical back: Normal range of motion and neck supple.      Right lower leg: No edema.      Left lower leg: No edema.   Skin:     General: Skin is warm and dry.      Capillary Refill: Capillary refill takes less than 2 seconds.   Neurological:      General: No focal deficit present.      Mental Status: He is alert.       EKG read in real-time by the emergency physician shows high lateral lead ST depression.  No reciprocal changes.  No STEMI.  Rate 82, DE interval 150, .       Results for orders placed or performed during the hospital encounter of 07/21/22 (from the past 24 hour(s))   Symptomatic; Unknown Influenza A/B & SARS-CoV2 (COVID-19) Virus PCR Multiplex Nose    Specimen: Nose; Swab   Result Value Ref Range    Influenza A PCR Negative Negative    Influenza B PCR Negative Negative    RSV PCR Negative Negative    SARS CoV2 PCR Negative Negative    Narrative    Testing was performed using the Xpert Xpress CoV2/Flu/RSV Assay on the [employer] GeneXpert Instrument. This test should be ordered for the detection of SARS-CoV-2 and influenza viruses in individuals who meet clinical and/or epidemiological criteria. Test performance is unknown in asymptomatic patients. This test is for in vitro diagnostic use under the FDA EUA for laboratories certified under CLIA to perform high or moderate complexity testing. This test has not been FDA cleared or approved. A negative result does not rule out the presence of PCR inhibitors in the specimen or target RNA in concentration below the limit of detection for the assay. If only one viral target is positive but coinfection with multiple targets is suspected, the sample should be re-tested with another FDA cleared, approved, or authorized test, if  coinfection would change clinical management. This test was validated by the [facility] [employer]. These laboratories are certified under the Clinical  Laboratory Improvement Amendments of 1988 (CLIA-88) as qualified to perform high complexity laboratory testing.   CBC with platelets differential    Narrative    The following orders were created for panel order CBC with platelets differential.  Procedure                               Abnormality         Status                     ---------                               -----------         ------                     CBC with platelets and d...[[id number]]                      Final result                 Please view results for these tests on the individual orders.   Basic metabolic panel   Result Value Ref Range    Sodium 139 133 - 144 mmol/L    Potassium 2.8 (LL) 3.4 - 5.3 mmol/L    Chloride 102 94 - 109 mmol/L    Carbon Dioxide (CO2) 27 20 - 32 mmol/L    Anion Gap 10 3 - 14 mmol/L    Urea Nitrogen 13 7 - 30 mg/dL    Creatinine 0.78 0.66 - 1.25 mg/dL    Calcium 8.8 8.5 - 10.1 mg/dL    Glucose 114 (H) 70 - 99 mg/dL    GFR Estimate >90 >60 mL/min/1.73m2   Troponin I   Result Value Ref Range    Troponin I High Sensitivity 8 <79 ng/L   D dimer quantitative   Result Value Ref Range    D-Dimer Quantitative 0.49 0.00 - 0.50 ug/mL FEU    Narrative    This D-dimer assay is intended for use in conjunction with a clinical pretest probability assessment model to exclude pulmonary embolism (PE) and deep venous thrombosis (DVT) in outpatients suspected of PE or DVT. The cut-off value is 0.50 ug/mL FEU.   CBC with platelets and differential   Result Value Ref Range    WBC Count 9.0 4.0 - 11.0 10e3/uL    RBC Count 5.03 4.40 - 5.90 10e6/uL    Hemoglobin 16.6 13.3 - 17.7 g/dL    Hematocrit 46.3 40.0 - 53.0 %    MCV 92 78 - 100 fL    MCH 33.0 26.5 - 33.0 pg    MCHC 35.9 31.5 - 36.5 g/dL    RDW 12.6 10.0 - 15.0 %    Platelet Count 205 150 - 450 10e3/uL    % Neutrophils 50 %     % Lymphocytes 39 %    % Monocytes 8 %    % Eosinophils 2 %    % Basophils 1 %    % Immature Granulocytes 0 %    NRBCs per 100 WBC 0 <1 /100    Absolute Neutrophils 4.6 1.6 - 8.3 10e3/uL    Absolute Lymphocytes 3.5 0.8 - 5.3 10e3/uL    Absolute Monocytes 0.8 0.0 - 1.3 10e3/uL    Absolute Eosinophils 0.2 0.0 - 0.7 10e3/uL    Absolute Basophils 0.1 0.0 - 0.2 10e3/uL    Absolute Immature Granulocytes 0.0 <=0.4 10e3/uL    Absolute NRBCs 0.0 10e3/uL   Extra Tube    Narrative    The following orders were created for panel order Extra Tube.  Procedure                               Abnormality         Status                     ---------                               -----------         ------                     Extra Red Top Tube[[id number]]                               Final result                 Please view results for these tests on the individual orders.   Extra Red Top Tube   Result Value Ref Range    Hold Specimen JIC    XR Chest Port 1 View    Narrative    Exam:  XR CHEST PORT 1 VIEW    HISTORY: cp.    COMPARISON:  7/30/2014    FINDINGS:     The cardiomediastinal contours are normal.      No focal consolidation, effusion, or pneumothorax.      No acute osseous abnormality.       Impression    IMPRESSION:      No acute cardiopulmonary process.      BISMARK SALAS MD         SYSTEM ID:  [id number]   CTA Chest with Contrast    Narrative    Exam:  CTA CHEST WITH CONTRAST    Exam reason:  cp persistent, HTN, sob    Technique:  Contrast enhanced helical CT pulmonary angiography was  performed. Sagittal and coronal reformats as well as coronal MIP  reformats were obtained.     Meds/Contrast: Isovue 370, 71 mL    Comparison:  7/21/2020    FINDINGS:    Technical quality: Diagnostic.    Cardiovascular:   -There are no filling defects in the pulmonary arteries that would  indicate pulmonary embolism.  -The main pulmonary artery is normal caliber.  -No aortic aneurysm or evidence of aortic dissection.  Lungs/Airways: No  mass, infiltrates, or significant nodules.  Anisha/Mediastinum: No adenopathy or mass.  Pleura: No pleural effusion or pneumothorax.  Chest Wall/Axilla: Unremarkable.    Visualized Upper Abdomen: No acute findings in the visualized upper  abdomen.  Musculoskeletal: No acute osseous abnormalities.      Impression    IMPRESSION:    No pulmonary embolism. No acute findings in the chest.    BISMARK SALAS MD         SYSTEM ID:  [id number]   Troponin I   Result Value Ref Range    Troponin I High Sensitivity 10 <79 ng/L       Medications   cloNIDine (CATAPRES) tablet 0.2 mg (0.2 mg Oral Given 7/21/22 1648)   potassium chloride (KLOR-CON) Packet 40 mEq (40 mEq Oral Given 7/21/22 1745)   iopamidol (ISOVUE-370) solution 72 mL (72 mLs Intravenous Given 7/21/22 1832)   sodium chloride (PF) 0.9% PF flush 94 mL (94 mLs Intravenous Given 7/21/22 1832)       Assessments & Plan (with Medical Decision Making)   Patient presenting with chest pain of unclear etiology.  He does have risk factors.  Prominent hypertension.  Initial EKG without STEMI.  Troponin negative.  CTA and second troponin pending.  The plan is to discharge the patient with appropriate discharge and follow-up instructions    New Prescriptions    No medications on file       Final diagnoses:   Chest pain, unspecified type       7/21/2022   [facility]     Pedro Lewis MD  07/21/22 1834       Osvaldo Sanchez DO  07/21/22 2004

## 2022-07-21 NOTE — ED NOTES
DATE:  7/21/2022   TIME OF RECEIPT FROM LAB:  1721  LAB TEST:  potassium  LAB VALUE:  2.8  RESULTS GIVEN WITH READ-BACK TO Dr Lewis  TIME LAB VALUE REPORTED TO PROVIDER:   4941

## 2022-07-22 ENCOUNTER — TELEPHONE (OUTPATIENT)
Dept: EMERGENCY MEDICINE | Facility: HOSPITAL | Age: 62
End: 2022-07-22

## 2022-07-22 NOTE — TELEPHONE ENCOUNTER
Good Morning,     We received a STAT order from ER, Dr. Sanchez for a MUGA Rest test.   I am wondering if this was an error.   A MUGA exam is done typically for patients undergoing chemo and we need to verify their EF is 50% or more.   This test is done as:   3 mL of the patients blood is collected and tagged with a radioactive isotope.  The blood/isotope is injected back into the patient and 1 image is done performed then the Ejection Fraction is calculated.    Based upon the indication of chest pain, I am thinking the provider wants a Nuclear Medicine Stress Test where we look for blockages at rest and stress.   This exam can be performed 2 ways.  Via treadmill or chemical.    Nuclear Medicine Treadmill Stress Test:  [id number]  Nuclear Medicine Lexiscan Stress Test:  [id number]    I am done today at 11 AM and will be returning next Wednesday.  I will attach our  Lucía.  ([phone]).    Please let us know how you would like to proceed with this patient's care.    A new order will need to be placed, if the wrong order was placed.    Thank you,  Juhi Clemente  [phone]

## 2022-12-25 ENCOUNTER — NURSE TRIAGE (OUTPATIENT)
Dept: NURSING | Facility: CLINIC | Age: 62
End: 2022-12-25

## 2022-12-25 NOTE — TELEPHONE ENCOUNTER
"Patient just tested positive for covid on a home test.  Patient tested positive today 12/25/2022.  Symptoms are chills, cough.  Symptoms started on 12/23/2022.  Patient declines triage.      Coronavirus (COVID-19) Notification    Caller Name (Patient, parent, daughter/son, grandparent, etc)  Sidney Pereira    Reason for call  Notify of Positive Coronavirus (COVID-19) lab results, assess symptoms,  review [facility] recommendations    Lab Result    Lab test:  2019-nCoV rRt-PCR or SARS-CoV-2 PCR    Oropharyngeal AND/OR nasopharyngeal swabs is POSITIVE for 2019-nCoV RNA/SARS-COV-2 PCR (COVID-19 virus)    Brief introduction script  Introduce self then review script:  \"I am calling on behalf of [employer].  We were notified that your Coronavirus test (COVID-19) for was POSITIVE for the virus.\"    Gather patient reported symptoms   Assessment   Current Symptoms at time of phone call, reported by patient: (if no symptoms, document No symptoms]  chills, cough   Date of Symptom(s) onset (if applicable) 12/23/2022     If at time of call, Patients symptoms hare worsened, the Patient should contact 911 or have someone drive them to Emergency Dept promptly:      If Patient calling 911, inform 911 personal that you have tested positive for the Coronavirus (COVID-19).  Place mask on and await 911 to arrive.    If Emergency Dept, If possible, please have another adult drive you to the Emergency Dept but you need to wear mask when in contact with other people.      Monoclonal Antibody Administration    You may be eligible to receive a new treatment with a monoclonal antibody for preventing hospitalization in patients at high risk for complications from COVID-19. This medication is still experimental and available on a limited basis; it is given through an IV and must be given at an infusion center. Please note that not all people who are eligible will receive the medication since it is in limited supply.  Is the " patient symptomatic and onset of symptoms within the last 7 days?  Yes  Is the patient interested in a visit with a provider to discuss treatment options?: Yes  Is the patient seen at [facility]?  No: Warm transfer caller to [phone] to be scheduled with a virtual urgent provider.  During transfer, instruct  on appropriate time frame for visit     Review information with Patient    Your result was positive. This means you have COVID-19 (coronavirus).      How can I protect others?    These guidelines are for isolating before returning to work, school or .       If you DO have symptoms:  o Stay home and away from others  - For at least 5 days after your symptoms started, AND   - You are fever free for 24 hours (with no medicine that reduces fever), AND  - Your other symptoms are better.  o Wear a mask for 10 full days any time you are around others.    If you DON'T have symptoms:  o Stay at home and away from others for at least 5 days after your positive test.  o Wear a mask for 10 full days any time you are around others.    There may be different guidelines for healthcare facilities. Please check with the specific sites before arriving.     If you've been told by a doctor that you were severely ill with COVID-19 or are immunocompromised, you should isolate for at least 10 days.    You should not go back to work until you meet the guidelines above for ending your home isolation. You don't need to be retested for COVID-19 before going back to work--studies show that you won't spread the virus if it's been at least 10 days since your symptoms started (or 20 days, if you have a weak immune system).    Employers, schools, and daycares: This is an official notice for this person's medical guidelines for returning in-person. They must meet the above guidelines before going back to work, school, or  in person.    You will receive a positive COVID-19 letter via [employer] or the mail soon  with additional self-care information.      Would you like me to review some of that information with you now?  Yes    How can I take care of myself?      Get lots of rest. Drink extra fluids (unless a doctor has told you not to).      Take Tylenol (acetaminophen) for fever or pain. If you have liver or kidney problems, ask your family doctor if it's okay to take Tylenol.     Take either:     650 mg (two 325 mg pills) every 4 to 6 hours, or     1,000 mg (two 500 mg pills) every 8 hours as needed.     Note: Do not take more than 3,000 mg in one day. Acetaminophen is found in many medicines (both prescribed and over-the-counter medicines). Read all labels to be sure you don't take too much.    For children, check the Tylenol bottle for the right dose (based on their age or weight).      If you have other health problems (like cancer, heart failure, an organ transplant or severe kidney disease): Call your specialty clinic if you don't feel better in the next 2 days.      Know when to call 911: Emergency warning signs include:    Trouble breathing or shortness of breath    Pain or pressure in the chest that doesn't go away    Feeling confused like you haven't felt before, or not being able to wake up    Bluish-colored lips or face        If you were tested for an upcoming procedure, please contact your provider for next steps.     Leann Salinas RN    Reason for Disposition    Health Information question, no triage required and triager able to answer question    Additional Information    Negative: RN needs further essential information from caller in order to complete triage    Negative: Requesting regular office appointment    Negative: [1] Caller requesting NON-URGENT health information AND [2] PCP's office is the best resource    Protocols used: INFORMATION ONLY CALL - NO TRIAGE-A-

## 2022-12-28 ENCOUNTER — VIRTUAL VISIT (OUTPATIENT)
Dept: FAMILY MEDICINE | Facility: CLINIC | Age: 62
End: 2022-12-28
Payer: MEDICARE

## 2022-12-28 DIAGNOSIS — U07.1 CLINICAL DIAGNOSIS OF COVID-19: Primary | ICD-10-CM

## 2022-12-28 DIAGNOSIS — G89.29 OTHER CHRONIC PAIN: ICD-10-CM

## 2022-12-28 PROCEDURE — 99442 PR PHYSICIAN TELEPHONE EVALUATION 11-20 MIN: CPT | Mod: CS | Performed by: FAMILY MEDICINE

## 2022-12-28 RX ORDER — LISINOPRIL 20 MG/1
20 TABLET ORAL DAILY
COMMUNITY

## 2022-12-28 NOTE — PROGRESS NOTES
Sidney is a 62 year old who is being evaluated via a billable telephone visit.      What phone number would you like to be contacted at? [phone]  How would you like to obtain your AVS? Mail a copy    Distant Location (provider location):  On-site    Assessment & Plan     Clinical diagnosis of COVID-19  Will do paxlovid and call if any worsening symptoms.  - nirmatrelvir and ritonavir (PAXLOVID) therapy pack  Dispense: 30 each; Refill: 0    Other chronic pain  Pt will plan to take 1/2 of pain pills for the next 5 days.  Advised of opioid toxicity and [facility] Pt understands to take 1/2 tablet of both morphine and vicodin while on paxlovid.                 Nicotine/Tobacco Cessation:  He reports that he has been smoking. He has a 40.00 pack-year smoking history. He has never used smokeless tobacco.  Nicotine/Tobacco Cessation Plan:             No follow-ups on file.    Aparna Jade MD  [facility]   Sidney is a 62 year old, presenting for the following health issues:  Covid Concern      HPI       COVID-19 Symptom Review  How many days ago did these symptoms start? Sxs started 12/26/22    Are any of the following symptoms significant for you?  New or worsening difficulty breathing? Yes    Please describe what kind of difficulty you are having breathing:Mild dyspnea (able to do ADLs without difficulty, mild shortness of breath with activities such as climbing one or two flights of stairs or walking briskly)    Worsening cough? Yes, I am coughing up mucus.    Fever or chills? Yes, I felt feverish or had chills.    Headache: YES    Sore throat: YES    Chest pain: No    Diarrhea: No    Body aches? YES    What treatments has patient tried? Decongestant - oral   Does patient live in a nursing home, group home, or shelter? No  Does patient have a way to get food/medications during quarantined? Yes, I have a friend or family member who can help  me.                  Review of Systems   Constitutional, HEENT, cardiovascular, pulmonary, gi and gu systems are negative, except as otherwise noted.      Objective           Vitals:  No vitals were obtained today due to virtual visit.    Physical Exam   healthy, alert and no distress  PSYCH: Alert and oriented times 3; coherent speech, normal   rate and volume, able to articulate logical thoughts, able   to abstract reason, no tangential thoughts, no hallucinations   or delusions  His affect is normal  RESP: No cough, no audible wheezing, able to talk in full sentences  Remainder of exam unable to be completed due to telephone visits                Phone call duration: 15 minutes

## 2022-12-28 NOTE — PATIENT INSTRUCTIONS
COVID-19 Outpatient Treatments  Your care team can help you find the best treatments for COVID-19. Talk to a health care provider or refer to the FDA medicine fact sheets below.    Important: You can't have Paxlovid or molnupiravir if you're starting the medicine more than 5 days after your symptoms have started.  Paxlovid: https://www.fda.gov/media/786452/download  Molnupiravir (Lagevrio): https://www.fda.gov/media/813400/download  Paxlovid (nimatrelvir and ritonavir)  How it works  Two medicines (nirmatrelvir and ritonavir) are taken together. They stop the virus from growing. Less amount of virus is easier for your body to fight.  Benefits  Lowers risk of a hospital stay or death from COVID-19.  How to take    Medicine comes in a daily container with both medicine tablets. Take by mouth twice daily (once in the morning, once at night) for 5 days.    The number of tablets to take varies by patient.    Don't chew or break capsules. Swallow whole.  When to take  Take as soon as possible after positive COVID-19 test result, and within 5 days of your first symptoms.  Who can take it  Patients must be 12 years or older, weigh at least 88 pounds, and have tested positive for COVID-19. Paxlovid is the preferred treatment for pregnant patients.  Possible side effects  Can cause altered sense of taste, diarrhea (loose, watery stools), high blood pressure, muscle aches.  Medicine conflicts    Some medicines may conflict with Paxlovid and may cause serious side effects.    Tell your care team about all the medicines you take, including prescription and over-the-counter medicines, vitamins, and herbal supplements.    Your care team will review your medicines to make sure that you can safely take Paxlovid.  Cautions    Paxlovid is not advised for patients with severe kidney or liver disease. If you have kidney or liver problems, the dose may need to be changed.    If you're pregnant or breastfeeding, talk to your care team  about your options.    If you take hormonal birth control (such as the Pill), then you or your partner should also use a non-hormonal form of birth control (such as a condom). Keep doing this for 1 menstrual cycle after your last dose of Paxlovid.  Molnupiravir (Lagevrio)  How it works  Stops the virus from growing. Less amount of virus is easier for your body to fight.  Benefits  Lowers risk of a hospital stay or death from COVID-19.  How to take  Take 4 capsules by mouth every 12 hours (4 in the morning and 4 at night) for 5 days. Don't chew or break capsules. Swallow whole.  When to take  Take as soon as possible after positive COVID-19 test result, and within 5 days of your first symptoms.  Who can take it  Patients must be 18 years or older and have tested positive for COVID-19.  Possible side effects  Diarrhea (loose, watery stools), nausea (feeling sick to your stomach), dizziness, headaches.  Medicine conflicts  Right now, there are no known conflicts with other drugs. But tell your care team about all medicines you take.  Cautions    This medicine is not advised for patients who are pregnant.    If you are someone who could become pregnant, use trusted birth control until 4 days after your last dose of molnupiravir.    If your partner could become pregnant, you should use trusted birth control until 3 months after your last dose of molnupiravir.  For informational purposes only. Not to replace the advice of your health care provider. Copyright   2022 [facility]. All rights reserved. Clinically reviewed by Radha Foss, PharmD, BCACP. [employer] [id number] - REV 12/22.

## 2024-04-11 ENCOUNTER — APPOINTMENT (OUTPATIENT)
Dept: GENERAL RADIOLOGY | Facility: HOSPITAL | Age: 64
End: 2024-04-11
Attending: EMERGENCY MEDICINE
Payer: MEDICARE

## 2024-04-11 ENCOUNTER — HOSPITAL ENCOUNTER (EMERGENCY)
Facility: HOSPITAL | Age: 64
Discharge: HOME | End: 2024-04-11
Attending: EMERGENCY MEDICINE | Admitting: EMERGENCY MEDICINE
Payer: MEDICARE

## 2024-04-11 VITALS
DIASTOLIC BLOOD PRESSURE: 72 MMHG | HEART RATE: 88 BPM | OXYGEN SATURATION: 95 % | SYSTOLIC BLOOD PRESSURE: 109 MMHG | TEMPERATURE: 98.4 F | RESPIRATION RATE: 18 BRPM

## 2024-04-11 DIAGNOSIS — R00.2 PALPITATIONS: ICD-10-CM

## 2024-04-11 DIAGNOSIS — K21.00 GASTROESOPHAGEAL REFLUX DISEASE WITH ESOPHAGITIS WITHOUT HEMORRHAGE: ICD-10-CM

## 2024-04-11 LAB
ALBUMIN SERPL BCG-MCNC: 4.4 G/DL (ref 3.5–5.2)
ALP SERPL-CCNC: 59 U/L (ref 40–150)
ALT SERPL W P-5'-P-CCNC: 38 U/L (ref 0–70)
ANION GAP SERPL CALCULATED.3IONS-SCNC: 13 MMOL/L (ref 7–15)
AST SERPL W P-5'-P-CCNC: 23 U/L (ref 0–45)
ATRIAL RATE - MUSE: 86 BPM
BASOPHILS # BLD AUTO: 0.1 10E3/UL (ref 0–0.2)
BASOPHILS NFR BLD AUTO: 1 %
BILIRUB SERPL-MCNC: 0.5 MG/DL
BUN SERPL-MCNC: 10.7 MG/DL (ref 8–23)
CALCIUM SERPL-MCNC: 9.7 MG/DL (ref 8.8–10.2)
CHLORIDE SERPL-SCNC: 102 MMOL/L (ref 98–107)
CREAT SERPL-MCNC: 0.75 MG/DL (ref 0.67–1.17)
DEPRECATED HCO3 PLAS-SCNC: 23 MMOL/L (ref 22–29)
DIASTOLIC BLOOD PRESSURE - MUSE: NORMAL MMHG
EGFRCR SERPLBLD CKD-EPI 2021: >90 ML/MIN/1.73M2
EOSINOPHIL # BLD AUTO: 0.1 10E3/UL (ref 0–0.7)
EOSINOPHIL NFR BLD AUTO: 1 %
ERYTHROCYTE [DISTWIDTH] IN BLOOD BY AUTOMATED COUNT: 12.6 % (ref 10–15)
GLUCOSE SERPL-MCNC: 154 MG/DL (ref 70–99)
HCT VFR BLD AUTO: 43 % (ref 40–53)
HGB BLD-MCNC: 15.1 G/DL (ref 13.3–17.7)
HOLD SPECIMEN: NORMAL
IMM GRANULOCYTES # BLD: 0 10E3/UL
IMM GRANULOCYTES NFR BLD: 0 %
INTERPRETATION ECG - MUSE: NORMAL
LYMPHOCYTES # BLD AUTO: 2.6 10E3/UL (ref 0.8–5.3)
LYMPHOCYTES NFR BLD AUTO: 25 %
MAGNESIUM SERPL-MCNC: 1.9 MG/DL (ref 1.7–2.3)
MCH RBC QN AUTO: 32.8 PG (ref 26.5–33)
MCHC RBC AUTO-ENTMCNC: 35.1 G/DL (ref 31.5–36.5)
MCV RBC AUTO: 94 FL (ref 78–100)
MONOCYTES # BLD AUTO: 0.6 10E3/UL (ref 0–1.3)
MONOCYTES NFR BLD AUTO: 6 %
NEUTROPHILS # BLD AUTO: 6.9 10E3/UL (ref 1.6–8.3)
NEUTROPHILS NFR BLD AUTO: 67 %
NRBC # BLD AUTO: 0 10E3/UL
NRBC BLD AUTO-RTO: 0 /100
P AXIS - MUSE: -5 DEGREES
PLATELET # BLD AUTO: 184 10E3/UL (ref 150–450)
POTASSIUM SERPL-SCNC: 3.9 MMOL/L (ref 3.4–5.3)
PR INTERVAL - MUSE: 126 MS
PROT SERPL-MCNC: 6.8 G/DL (ref 6.4–8.3)
QRS DURATION - MUSE: 94 MS
QT - MUSE: 362 MS
QTC - MUSE: 433 MS
R AXIS - MUSE: 19 DEGREES
RBC # BLD AUTO: 4.6 10E6/UL (ref 4.4–5.9)
SODIUM SERPL-SCNC: 138 MMOL/L (ref 135–145)
SYSTOLIC BLOOD PRESSURE - MUSE: NORMAL MMHG
T AXIS - MUSE: 43 DEGREES
TROPONIN T SERPL HS-MCNC: 8 NG/L
TSH SERPL DL<=0.005 MIU/L-ACNC: 0.76 UIU/ML (ref 0.3–4.2)
VENTRICULAR RATE- MUSE: 86 BPM
WBC # BLD AUTO: 10.3 10E3/UL (ref 4–11)

## 2024-04-11 PROCEDURE — 85004 AUTOMATED DIFF WBC COUNT: CPT | Performed by: EMERGENCY MEDICINE

## 2024-04-11 PROCEDURE — 80053 COMPREHEN METABOLIC PANEL: CPT | Performed by: EMERGENCY MEDICINE

## 2024-04-11 PROCEDURE — 99284 EMERGENCY DEPT VISIT MOD MDM: CPT | Performed by: EMERGENCY MEDICINE

## 2024-04-11 PROCEDURE — 250N000013 HC RX MED GY IP 250 OP 250 PS 637: Performed by: EMERGENCY MEDICINE

## 2024-04-11 PROCEDURE — 93005 ELECTROCARDIOGRAM TRACING: CPT

## 2024-04-11 PROCEDURE — 93010 ELECTROCARDIOGRAM REPORT: CPT | Performed by: INTERNAL MEDICINE

## 2024-04-11 PROCEDURE — 99285 EMERGENCY DEPT VISIT HI MDM: CPT | Mod: 25

## 2024-04-11 PROCEDURE — 36415 COLL VENOUS BLD VENIPUNCTURE: CPT | Performed by: EMERGENCY MEDICINE

## 2024-04-11 PROCEDURE — 84484 ASSAY OF TROPONIN QUANT: CPT | Performed by: EMERGENCY MEDICINE

## 2024-04-11 PROCEDURE — 84443 ASSAY THYROID STIM HORMONE: CPT | Performed by: EMERGENCY MEDICINE

## 2024-04-11 PROCEDURE — 71045 X-RAY EXAM CHEST 1 VIEW: CPT

## 2024-04-11 PROCEDURE — 83735 ASSAY OF MAGNESIUM: CPT | Performed by: EMERGENCY MEDICINE

## 2024-04-11 RX ORDER — MAGNESIUM HYDROXIDE/ALUMINUM HYDROXICE/SIMETHICONE 120; 1200; 1200 MG/30ML; MG/30ML; MG/30ML
15 SUSPENSION ORAL ONCE
Status: COMPLETED | OUTPATIENT
Start: 2024-04-11 | End: 2024-04-11

## 2024-04-11 RX ORDER — PRAVASTATIN SODIUM 10 MG
TABLET ORAL AT BEDTIME
COMMUNITY

## 2024-04-11 RX ADMIN — ALUMINUM HYDROXIDE, MAGNESIUM HYDROXIDE, AND DIMETHICONE 15 ML: 200; 20; 200 SUSPENSION ORAL at 13:49

## 2024-04-11 ASSESSMENT — COLUMBIA-SUICIDE SEVERITY RATING SCALE - C-SSRS
2. HAVE YOU ACTUALLY HAD ANY THOUGHTS OF KILLING YOURSELF IN THE PAST MONTH?: NO
1. IN THE PAST MONTH, HAVE YOU WISHED YOU WERE DEAD OR WISHED YOU COULD GO TO SLEEP AND NOT WAKE UP?: NO
6. HAVE YOU EVER DONE ANYTHING, STARTED TO DO ANYTHING, OR PREPARED TO DO ANYTHING TO END YOUR LIFE?: NO

## 2024-04-11 ASSESSMENT — ACTIVITIES OF DAILY LIVING (ADL)
ADLS_ACUITY_SCORE: 35
ADLS_ACUITY_SCORE: 35

## 2024-04-11 NOTE — DISCHARGE INSTRUCTIONS
Your EKG, labs and chest xray are normal.  Please continue monitoring symptoms.  If you have new, recurring, worsening or any concerning symptoms please return to your closes ER for re-evaluation.    Please remember that examination and testing performed in the emergency department is not a comprehensive evaluation of all medical conditions and does not replace the need to follow up with your primary care provider. In the emergency department, we are only able to evaluate your symptoms in the current condition, but symptoms may change or worsen. Although you are felt safe to be discharged today, if your symptoms persist or change, you need to be re-evaluated by your regular/primary care doctor as soon as possible. If you are unable to make appointment with your regular doctor, please come back to the ER to be re-evaluated. If your symptoms worsen or if you have any concerns please return to the ER for re-evaluation.

## 2024-04-11 NOTE — ED PROVIDER NOTES
History     Chief Complaint   Patient presents with    Hypertension    Irregular Heart Beat     [facility]  Sidney Pereira is a 63 year old male PMH HTN, HLD who with a chief complaint of palpitations and indigestion.  Patient states was shopping when he felt indigestion, substernal burning sensation, nonradiating, nonexertional, nonpleuritic improved after 10 minutes after taking tums, at approx 10am. Patient states had palpitations after with associated lightheadedness; patient denies symptoms at this time. Patient checked BP at home, systolics in 160s.   Patient denies Fhx early CAD; +tobacco use x more than 40 years, currently 1ppd, previously 2-3 ppd. Denies hx CVA, PAD, CAD.       Allergies:  Allergies   Allergen Reactions    Diazepam Itching     Valium     Nicotine      GI reaction, dizziness - per [facility]    Other [No Clinical Screening - See Comments]      Tapioca..       Problem List:    There are no problems to display for this patient.       Past Medical History:    No past medical history on file.    Past Surgical History:    No past surgical history on file.    Family History:    No family history on file.    Social History:  Marital Status:   [2]  Social History     Tobacco Use    Smoking status: Every Day     Current packs/day: 1.00     Average packs/day: 1 pack/day for 40.0 years (40.0 ttl pk-yrs)     Types: Cigarettes    Smokeless tobacco: Never   Substance Use Topics    Alcohol use: No    Drug use: No        Medications:    aspirin (ASA) 81 MG tablet  HYDROcodone-Acetaminophen (VICODIN PO)  lisinopril (ZESTRIL) 20 MG tablet  Morphine Sulfate ER 15 MG T12A  pravastatin (PRAVACHOL) 10 MG tablet          Review of Systems    All systems reviewed and negative with exception of that stated in the HPI      Physical Exam   BP: 162/89  Pulse: 87  Temp: 98.4  F (36.9  C)  Resp: 18  SpO2: 95 %      Physical Exam    INITIAL VITAL SIGNS: Reviewed by me  GENERAL: Alert and interactive. No acute  distress  HEAD: Head is normocephalic and atraumatic  EYES: No conjunctival injection  ENT: Moist mucous membranes.  NECK: Supple. No masses. Full range of motion  RESPIRATORY: No tachypnea. Clear breath sounds bilaterally. No wheezing, rales, or rhonchi  CV: Regular rate and rhythm. No murmurs, rubs, or gallops  ABDOMEN: Soft, non-distended, non-tender. No guarding. No rebound. No masses.  EXTREMITIES: No deformity. No cyanosis. No edema. No calf tenderness.   SKIN: Warm and dry. No obvious rashes.  NEUROLOGIC: Alert and oriented. Face is symmetric. Speech is normal. Moves all extremities equally.     ED Course            EKG Interpretation:      Interpreted by Caridad Santana MD  Time reviewed: 13:37  Symptoms at time of EK:37   Rhythm: normal sinus   Rate: normal  Axis: normal  Ectopy: none  Conduction: normal  ST Segments/ T Waves: No ST-T wave changes  Q Waves: none  Comparison to prior: Unchanged from 2022    Clinical Impression: normal EKG      Results for orders placed or performed during the hospital encounter of 24 (from the past 24 hour(s))   EKG 12-lead, tracing only   Result Value Ref Range    Systolic Blood Pressure  mmHg    Diastolic Blood Pressure  mmHg    Ventricular Rate 86 BPM    Atrial Rate 86 BPM    NH Interval 126 ms    QRS Duration 94 ms     ms    QTc 433 ms    P Axis -5 degrees    R AXIS 19 degrees    T Axis 43 degrees    Interpretation ECG       Sinus rhythm  Normal ECG  No previous ECGs available  Confirmed by MD Neto, Isrrael Cordero ([id number]) on 2024 2:01:49 PM     CBC with platelets differential    Narrative    The following orders were created for panel order CBC with platelets differential.  Procedure                               Abnormality         Status                     ---------                               -----------         ------                     CBC with platelets and d...[[id number]]                      Final result                 Please view results  for these tests on the individual orders.   Comprehensive metabolic panel   Result Value Ref Range    Sodium 138 135 - 145 mmol/L    Potassium 3.9 3.4 - 5.3 mmol/L    Carbon Dioxide (CO2) 23 22 - 29 mmol/L    Anion Gap 13 7 - 15 mmol/L    Urea Nitrogen 10.7 8.0 - 23.0 mg/dL    Creatinine 0.75 0.67 - 1.17 mg/dL    GFR Estimate >90 >60 mL/min/1.73m2    Calcium 9.7 8.8 - 10.2 mg/dL    Chloride 102 98 - 107 mmol/L    Glucose 154 (H) 70 - 99 mg/dL    Alkaline Phosphatase 59 40 - 150 U/L    AST 23 0 - 45 U/L    ALT 38 0 - 70 U/L    Protein Total 6.8 6.4 - 8.3 g/dL    Albumin 4.4 3.5 - 5.2 g/dL    Bilirubin Total 0.5 <=1.2 mg/dL   Magnesium   Result Value Ref Range    Magnesium 1.9 1.7 - 2.3 mg/dL   Troponin T, High Sensitivity   Result Value Ref Range    Troponin T, High Sensitivity 8 <=22 ng/L   TSH with free T4 reflex   Result Value Ref Range    TSH 0.76 0.30 - 4.20 uIU/mL   CBC with platelets and differential   Result Value Ref Range    WBC Count 10.3 4.0 - 11.0 10e3/uL    RBC Count 4.60 4.40 - 5.90 10e6/uL    Hemoglobin 15.1 13.3 - 17.7 g/dL    Hematocrit 43.0 40.0 - 53.0 %    MCV 94 78 - 100 fL    MCH 32.8 26.5 - 33.0 pg    MCHC 35.1 31.5 - 36.5 g/dL    RDW 12.6 10.0 - 15.0 %    Platelet Count 184 150 - 450 10e3/uL    % Neutrophils 67 %    % Lymphocytes 25 %    % Monocytes 6 %    % Eosinophils 1 %    % Basophils 1 %    % Immature Granulocytes 0 %    NRBCs per 100 WBC 0 <1 /100    Absolute Neutrophils 6.9 1.6 - 8.3 10e3/uL    Absolute Lymphocytes 2.6 0.8 - 5.3 10e3/uL    Absolute Monocytes 0.6 0.0 - 1.3 10e3/uL    Absolute Eosinophils 0.1 0.0 - 0.7 10e3/uL    Absolute Basophils 0.1 0.0 - 0.2 10e3/uL    Absolute Immature Granulocytes 0.0 <=0.4 10e3/uL    Absolute NRBCs 0.0 10e3/uL   Extra Tube    Narrative    The following orders were created for panel order Extra Tube.  Procedure                               Abnormality         Status                     ---------                               -----------          ------                     Extra Blue Top Tube[[id number]]                              Final result               Extra Red Top Tube[[id number]]                               Final result               Extra Heparinized Syringe[[id number]]                        Final result                 Please view results for these tests on the individual orders.   Extra Blue Top Tube   Result Value Ref Range    Hold Specimen JIC    Extra Red Top Tube   Result Value Ref Range    Hold Specimen JIC    Extra Heparinized Syringe   Result Value Ref Range    Hold Specimen JIC    XR Chest Port 1 View    Narrative    PROCEDURE:  XR CHEST PORT 1 VIEW    HISTORY: chest discomfort. .    COMPARISON:  7/21/2022    FINDINGS:    The cardiomediastinal contours are stable.  No focal consolidation, effusion or pneumothorax.      Impression    IMPRESSION:  Stable chest.      JUAN HARDEN MD         SYSTEM ID:  [id number]       Medications   alum & mag hydroxide-simethicone (MAALOX) suspension 15 mL (15 mLs Oral $Given 4/11/24 1347)       Assessments & Plan (with Medical Decision Making)     I have reviewed the nursing notes.    I have reviewed the findings, diagnosis, plan and need for follow up with the patient.       HEART Score  Background  Calculates the overall risk of adverse event in patient's presenting with chest pain.  Based on 5 criteria (each assigned 0-2 points) including suspiciousness of history, EKG, age, risk factors and troponin.    Data  63 year old male  does not have a problem list on file.   reports that he has been smoking. He has a 40 pack-year smoking history. He has never used smokeless tobacco.  family history is not on file.  Lab Results   Component Value Date    TROPI  07/30/2014     <0.015  The 99th percentile for upper reference range is 0.045 ug/L.  Troponin values in   the range of 0.045 - 0.120 ug/L may be associated with risks of adverse   clinical events.   Effective 7/30/2014, the reference range for this  assay has changed to reflect   new instrumentation/methodology.       Criteria   0-2 points for each of 5 items (maximum of 10 points):  Score 0- History slightly suspicious for coronary syndrome  Score 0- EKG Normal  Score 1- Age 45 to 65 years old  Score 2- Three or more risk factors for or history of atherosclerotic disease  Score 0- Within normal limits for troponin levels  Interpretation  Risk of adverse outcome  Heart Score: 3  Total Score 0-3- Adverse Outcome Risk 2.5% - Supports early discharge with appropriate follow-up          Medical Decision Making  The patient's presentation was of moderate complexity (2 or more stable chronic illnesses).    The patient's evaluation involved:  ordering and/or review of 3+ test(s) in this encounter (see separate area of note for details)    The patient's management necessitated moderate risk (prescription drug management including medications given in the ED).    Sidney Pereira is a 63 year old male PMH HTN, HLD who with a chief complaint of palpitations and indigestion today; patient states indigestion substernal similar to GERD in the past, nonexertional, nonpleuritic, nonradiating; patient with palpitations earlier today with lightheadedness.  VS hypertensive initially in the ED, improved with no interventions.  Exam is normal.    EKG nonischemic with no rhythm abnormalities  Labs independently interpreted by me:  Normal CBC  Normal CMP  Normal Troponin  Normal TSH    PERC negative  Low HEART Score  Normal CXR      Patient was given GI cocktail for GERD and states symptoms resolved  Patient is improved on re-evaluation  Advised to follow-up with PCP within 3-5 days  Return precautions given        Discharge Medication List as of 4/11/2024  2:51 PM          Final diagnoses:   Palpitations   Gastroesophageal reflux disease with esophagitis without hemorrhage       4/11/2024   [facility]       Caridad Santana MD  04/11/24 9622

## 2024-04-11 NOTE — ED NOTES
Reports he checked BP before taking his daily BP medications. Acquired indigestion 1 hour prior to the sensation of chest palpitations and brief spell of dizziness. Reports this resolved after a few seconds. Still having indigestion despite taking tums this morning.

## 2024-04-11 NOTE — ED TRIAGE NOTES
"\"BP at home 177/107, I also felt my heart skipping a couple of times.  I took a baby aspirin.  No chest pain.\"         "

## 2024-11-16 ENCOUNTER — HOSPITAL ENCOUNTER (EMERGENCY)
Facility: HOSPITAL | Age: 64
Discharge: HOME | End: 2024-11-16
Attending: PHYSICIAN ASSISTANT
Payer: MEDICARE

## 2024-11-16 ENCOUNTER — APPOINTMENT (OUTPATIENT)
Dept: GENERAL RADIOLOGY | Facility: HOSPITAL | Age: 64
End: 2024-11-16
Attending: PHYSICIAN ASSISTANT
Payer: MEDICARE

## 2024-11-16 VITALS
BODY MASS INDEX: 32.69 KG/M2 | RESPIRATION RATE: 18 BRPM | DIASTOLIC BLOOD PRESSURE: 89 MMHG | TEMPERATURE: 97 F | WEIGHT: 215 LBS | OXYGEN SATURATION: 97 % | HEART RATE: 80 BPM | SYSTOLIC BLOOD PRESSURE: 129 MMHG

## 2024-11-16 DIAGNOSIS — S63.502A WRIST SPRAIN, LEFT, INITIAL ENCOUNTER: ICD-10-CM

## 2024-11-16 PROCEDURE — 73110 X-RAY EXAM OF WRIST: CPT | Mod: LT

## 2024-11-16 PROCEDURE — 99213 OFFICE O/P EST LOW 20 MIN: CPT | Performed by: PHYSICIAN ASSISTANT

## 2024-11-16 PROCEDURE — G0463 HOSPITAL OUTPT CLINIC VISIT: HCPCS

## 2024-11-16 ASSESSMENT — ACTIVITIES OF DAILY LIVING (ADL): ADLS_ACUITY_SCORE: 0

## 2024-11-16 NOTE — ED PROVIDER NOTES
History     Chief Complaint   Patient presents with    Wrist Pain     HPI  Sidney Pereira is a 64 year old male who presents to urgent care with left wrist pain.  Patient states that yesterday he was pulling the skin off of a deer when he felt a crack in his left wrist causing pain.  Patient states that now anytime he attempts to move his left hand he has pain in his left wrist.  Patient states that he does have a previous old fracture from 1997 in the same wrist.  He denies any numbness, weakness of left hand.    Allergies:  Allergies   Allergen Reactions    Diazepam Itching     Valium     Nicotine      GI reaction, dizziness - per [facility]    Other [No Clinical Screening - See Comments]      Tapioca..       Problem List:    There are no active problems to display for this patient.       Past Medical History:    No past medical history on file.    Past Surgical History:    No past surgical history on file.    Family History:    No family history on file.    Social History:  Marital Status:   [2]  Social History     Tobacco Use    Smoking status: Every Day     Current packs/day: 1.00     Average packs/day: 1 pack/day for 40.0 years (40.0 ttl pk-yrs)     Types: Cigarettes    Smokeless tobacco: Never   Substance Use Topics    Alcohol use: No    Drug use: No        Medications:    Morphine Sulfate ER 15 MG T12A  aspirin (ASA) 81 MG tablet  HYDROcodone-Acetaminophen (VICODIN PO)  lisinopril (ZESTRIL) 20 MG tablet  pravastatin (PRAVACHOL) 10 MG tablet          Review of Systems   Musculoskeletal:         Left wrist pain   All other systems reviewed and are negative.      Physical Exam   BP: 129/89  Pulse: 80  Temp: 97  F (36.1  C)  Resp: 18  Weight: 97.5 kg (215 lb)  SpO2: 97 %      Physical Exam  Vitals and nursing note reviewed.   Constitutional:       General: He is not in acute distress.     Appearance: Normal appearance. He is not ill-appearing or toxic-appearing.   Cardiovascular:      Rate and Rhythm:  Regular rhythm.      Heart sounds: Normal heart sounds.   Pulmonary:      Breath sounds: Normal breath sounds.   Musculoskeletal:      Left wrist: Tenderness, bony tenderness and snuff box tenderness present. Decreased range of motion.      Comments: Left wrist; patient has tenderness palpation over scaphoid bone.  Pain with range of motion.  Normal strength, CMS intact.  No obvious swelling, erythema or bruising.   Neurological:      Mental Status: He is oriented to person, place, and time.         ED Course        Procedures             Critical Care time:               Results for orders placed or performed during the hospital encounter of 11/16/24 (from the past 24 hours)   XR Wrist Left 3 Views    Narrative    PROCEDURE:  XR WRIST LEFT G/E 3 VIEWS    HISTORY: Pain over scaphoid bone    COMPARISON: Radiographs 8/16/2021    TECHNIQUE:  XR WRIST LEFT 4 VIEWS    FINDINGS:    No acute fracture or dislocation is identified. Chronic healed Colles'  type fracture deformity of the distal radius. Dorsal angulation of the  lunate, suggesting dorsal intercalated instability. Advanced  radiocarpal joint space loss. No suspicious osseous lesion.    No foreign body or subcutaneous emphysema.        Impression    IMPRESSION:    Degenerative changes without acute osseous abnormality.    LESVIA ZAMORA MD         SYSTEM ID:  [id number]       Medications - No data to display    Assessments & Plan (with Medical Decision Making)   #1.  Wrist sprain, left    Discussed exam findings as well as x-ray results with patient.  No apparent acute fracture on x-ray.  Patient is encouraged to perform RICE.  Patient has pain meds at home for other medical conditions.  Any additional concerns patient can return to urgent care or follow-up with primary care provider.  Patient verbalized understanding and agreement of plan.    I have reviewed the nursing notes.    I have reviewed the findings, diagnosis, plan and need for follow up with the  patient.          New Prescriptions    No medications on file       Final diagnoses:   Wrist sprain, left, initial encounter       11/16/2024   [facility]       Hari Mcclain PA-C  11/16/24 [id number]

## 2024-11-16 NOTE — ED TRIAGE NOTES
Pt presents with c/o having increased left wrist pain reports heard something crack in the wrist last night while skinning a dear   Reports has had a previous hx of fracture to the same wrist   Reports increased pain when trying to move the thumb   No otc meds taken today

## 2025-04-18 ENCOUNTER — APPOINTMENT (OUTPATIENT)
Dept: GENERAL RADIOLOGY | Facility: HOSPITAL | Age: 65
End: 2025-04-18
Attending: NURSE PRACTITIONER
Payer: MEDICARE

## 2025-04-18 ENCOUNTER — HOSPITAL ENCOUNTER (EMERGENCY)
Facility: HOSPITAL | Age: 65
Discharge: HOME | End: 2025-04-18
Attending: NURSE PRACTITIONER | Admitting: NURSE PRACTITIONER
Payer: MEDICARE

## 2025-04-18 VITALS
DIASTOLIC BLOOD PRESSURE: 78 MMHG | HEIGHT: 68 IN | TEMPERATURE: 97.9 F | WEIGHT: 228 LBS | HEART RATE: 93 BPM | OXYGEN SATURATION: 95 % | RESPIRATION RATE: 16 BRPM | BODY MASS INDEX: 34.56 KG/M2 | SYSTOLIC BLOOD PRESSURE: 162 MMHG

## 2025-04-18 DIAGNOSIS — M54.2 CERVICALGIA: Primary | ICD-10-CM

## 2025-04-18 PROCEDURE — G0463 HOSPITAL OUTPT CLINIC VISIT: HCPCS

## 2025-04-18 PROCEDURE — 72040 X-RAY EXAM NECK SPINE 2-3 VW: CPT | Mod: 26 | Performed by: INTERNAL MEDICINE

## 2025-04-18 PROCEDURE — 99213 OFFICE O/P EST LOW 20 MIN: CPT | Performed by: NURSE PRACTITIONER

## 2025-04-18 PROCEDURE — 72040 X-RAY EXAM NECK SPINE 2-3 VW: CPT

## 2025-04-18 ASSESSMENT — ENCOUNTER SYMPTOMS
NECK PAIN: 1
TROUBLE SWALLOWING: 0
WEAKNESS: 0
NAUSEA: 0
EYES NEGATIVE: 1
DIZZINESS: 0
PSYCHIATRIC NEGATIVE: 1
CHILLS: 0
FEVER: 0
BACK PAIN: 1
NUMBNESS: 0
NECK STIFFNESS: 1
FACIAL SWELLING: 0
HEADACHES: 0
SHORTNESS OF BREATH: 0
VOMITING: 0
DIARRHEA: 0

## 2025-04-18 NOTE — ED TRIAGE NOTES
Pt presents with left side neck pain. Pt stated he slipped on ice and fell two weeks ago causing pain. PT has a fusion done 2012. Pt has been taking morphine at 0600 today.

## 2025-04-18 NOTE — DISCHARGE INSTRUCTIONS
Can use Tylenol as in between dose. Can also try Aleve. Rest  Alternate ice and heat  Gentle stretching  [facility]  Return to urgent care/ED with any worsening in condition or additional concerns.

## 2025-04-18 NOTE — ED TRIAGE NOTES
KERRY Butcher CNP assessed patient in triage and determined patient Urgent Care appropriate. Will be seen in Urgent Care.

## 2025-04-18 NOTE — ED PROVIDER NOTES
History     Chief Complaint   Patient presents with    Neck Pain     HPI  Sidney Pereira is a 64 year old male who presents to urgent care today ambulatory with complaints of ongoing neck pain after fall which occurred 2 weeks ago.  Patient states he was icy outside when he slipped and fell and hit the side of his truck.  No LOC, vision changes, new onset of headaches, nosebleed, ear drainage, BRBPR, hematuria.  No new onset of numbness or weakness of bilateral upper or lower extremities, no saddle anesthesia.  No bowel or bladder dysfunction.  Patient states he has history of cervical fusion in 2012.  Patient is currently on morphine, states he took that at 0600 this morning, no OTC medication.  No other concerns.    Allergies:  Allergies   Allergen Reactions    Diazepam Itching     Valium     Nicotine      GI reaction, dizziness - per [facility]    Other [No Clinical Screening - See Comments]      Tapioca..       Problem List:    There are no active problems to display for this patient.       Past Medical History:    History reviewed. No pertinent past medical history.    Past Surgical History:    History reviewed. No pertinent surgical history.    Family History:    History reviewed. No pertinent family history.    Social History:  Marital Status:   [2]  Social History     Tobacco Use    Smoking status: Every Day     Current packs/day: 1.00     Average packs/day: 1 pack/day for 40.0 years (40.0 ttl pk-yrs)     Types: Cigarettes    Smokeless tobacco: Never   Substance Use Topics    Alcohol use: No    Drug use: No        Medications:    aspirin (ASA) 81 MG tablet  HYDROcodone-Acetaminophen (VICODIN PO)  lisinopril (ZESTRIL) 20 MG tablet  Morphine Sulfate ER 15 MG T12A  pravastatin (PRAVACHOL) 10 MG tablet      Review of Systems   Constitutional:  Negative for chills and fever.   HENT:  Negative for dental problem (denies any denal issues from fall), ear discharge, facial swelling, nosebleeds and trouble  "swallowing.    Eyes: Negative.    Respiratory:  Negative for shortness of breath.    Cardiovascular:  Negative for chest pain.   Gastrointestinal:  Negative for diarrhea, nausea and vomiting.   Genitourinary:  Negative for decreased urine volume.   Musculoskeletal:  Positive for back pain (no new onset of pain, chronic back pain), neck pain and neck stiffness. Negative for gait problem.   Skin: Negative.    Neurological:  Negative for dizziness, syncope, weakness, numbness and headaches.   Psychiatric/Behavioral: Negative.       Physical Exam   BP: (!) 162/78  Pulse: 93  Temp: 97.9  F (36.6  C)  Resp: 16  Height: 172.7 cm (5' 8\")  Weight: 103.4 kg (228 lb)  SpO2: 95 %    Physical Exam  Vitals and nursing note reviewed.   Constitutional:       General: He is not in acute distress.     Appearance: Normal appearance. He is not ill-appearing or toxic-appearing.   Cardiovascular:      Rate and Rhythm: Normal rate and regular rhythm.      Pulses: Normal pulses.      Heart sounds: Normal heart sounds.   Pulmonary:      Effort: Pulmonary effort is normal.      Breath sounds: Normal breath sounds.   Musculoskeletal:      Cervical back: Bony tenderness present. No swelling, edema, deformity, erythema, lacerations or crepitus. Pain with movement present.      Thoracic back: Normal.   Neurological:      Mental Status: He is alert.   Psychiatric:         Mood and Affect: Mood normal.       ED Course     Procedures    Results for orders placed or performed during the hospital encounter of 04/18/25 (from the past 24 hours)   Cervical spine XR, 2-3 views    Narrative    EXAM: XR CERVICAL SPINE 2/3 VIEWS  LOCATION: [facility]  DATE: 4/18/2025    INDICATION: neck pain x 2 weeks post fall  COMPARISON: None.      Impression    IMPRESSION: Postoperative changes of ACDF at C5-C6. Mild retrolisthesis of C4 on C5. Cervical vertebral body heights appear maintained. Moderate disc height loss at C4-5. Multilevel facet hypertrophy. " Visualized soft tissues are unremarkable.         Medications - No data to display    Assessments & Plan (with Medical Decision Making)     I have reviewed the nursing notes.    I have reviewed the findings, diagnosis, plan and need for follow up with the patient.  (M54.2) Cervicalgia  (primary encounter diagnosis)  Plan:   Patient ambulatory with a nontoxic appearance.  Patient arrived with complaints of neck pain ongoing for 2 weeks.  Primary concern today was to rule out a fracture.  Patient states he takes morphine which has been working for the pain, does not want a thing else for pain.  Offered prednisone and patient declines.  X-ray cervical spine shows postoperative changes of ACDF at C5-C6.  Mild retrolithiasis of C4 on C5.  Cervical vertebrae body height appears maintained.  Moderate disc height loss at C4-5.  Multilevel facet hypertrophy.  No red flag symptoms.  Declines referral to neurosurgery and/or physical therapy, [facility]  Patient states pain is under control with a scheduled pain medication.  Patient to rest.  Alternate ice and heat.  Gentle stretching.  [facility]  Return to urgent care/ED with any worsening in condition or additional concerns.  Patient in agreement treatment plan.    Discharge Medication List as of 4/18/2025  9:52 AM        Final diagnoses:   Cervicalgia     4/18/2025   [facility]       Alla Butcher, YAS  04/18/25 1008